# Patient Record
Sex: FEMALE | Race: WHITE | NOT HISPANIC OR LATINO | Employment: FULL TIME | URBAN - METROPOLITAN AREA
[De-identification: names, ages, dates, MRNs, and addresses within clinical notes are randomized per-mention and may not be internally consistent; named-entity substitution may affect disease eponyms.]

---

## 2017-02-20 ENCOUNTER — ALLSCRIPTS OFFICE VISIT (OUTPATIENT)
Dept: OTHER | Facility: OTHER | Age: 47
End: 2017-02-20

## 2017-02-21 DIAGNOSIS — R10.11 RIGHT UPPER QUADRANT PAIN: ICD-10-CM

## 2017-02-21 DIAGNOSIS — R14.0 ABDOMINAL DISTENSION (GASEOUS): ICD-10-CM

## 2017-02-21 DIAGNOSIS — Z12.31 ENCOUNTER FOR SCREENING MAMMOGRAM FOR MALIGNANT NEOPLASM OF BREAST: ICD-10-CM

## 2017-03-09 ENCOUNTER — HOSPITAL ENCOUNTER (OUTPATIENT)
Dept: ULTRASOUND IMAGING | Facility: HOSPITAL | Age: 47
Discharge: HOME | End: 2017-03-09
Attending: FAMILY MEDICINE
Payer: COMMERCIAL

## 2017-03-09 DIAGNOSIS — R10.11 RIGHT UPPER QUADRANT PAIN: ICD-10-CM

## 2017-03-09 DIAGNOSIS — R14.0 ABDOMINAL DISTENSION (GASEOUS): ICD-10-CM

## 2017-03-09 PROCEDURE — 76700 US EXAM ABDOM COMPLETE: CPT

## 2017-03-10 ENCOUNTER — GENERIC CONVERSION - ENCOUNTER (OUTPATIENT)
Dept: OTHER | Facility: OTHER | Age: 47
End: 2017-03-10

## 2017-03-23 ENCOUNTER — GENERIC CONVERSION - ENCOUNTER (OUTPATIENT)
Dept: OTHER | Facility: OTHER | Age: 47
End: 2017-03-23

## 2017-03-23 ENCOUNTER — ALLSCRIPTS OFFICE VISIT (OUTPATIENT)
Dept: OTHER | Facility: OTHER | Age: 47
End: 2017-03-23

## 2017-03-29 LAB
HPV HIGH RISK (HISTORICAL): NORMAL
INTERPRETATION (HISTORICAL): NORMAL

## 2017-05-10 ENCOUNTER — GENERIC CONVERSION - ENCOUNTER (OUTPATIENT)
Dept: OTHER | Facility: OTHER | Age: 47
End: 2017-05-10

## 2017-05-11 ENCOUNTER — ALLSCRIPTS OFFICE VISIT (OUTPATIENT)
Dept: OTHER | Facility: OTHER | Age: 47
End: 2017-05-11

## 2017-05-13 LAB
A/G RATIO (HISTORICAL): 1.8 (ref 1.2–2.2)
ALBUMIN SERPL BCP-MCNC: 3.9 G/DL (ref 3.5–5.5)
ALP SERPL-CCNC: 41 IU/L (ref 39–117)
ALT SERPL W P-5'-P-CCNC: 15 IU/L (ref 0–32)
AST SERPL W P-5'-P-CCNC: 13 IU/L (ref 0–40)
BILIRUB SERPL-MCNC: 0.3 MG/DL (ref 0–1.2)
BUN SERPL-MCNC: 12 MG/DL (ref 6–24)
BUN/CREA RATIO (HISTORICAL): 20 (ref 9–23)
CALCIUM SERPL-MCNC: 8.9 MG/DL (ref 8.7–10.2)
CHLORIDE SERPL-SCNC: 104 MMOL/L (ref 96–106)
CHOLEST SERPL-MCNC: 233 MG/DL (ref 100–199)
CO2 SERPL-SCNC: 25 MMOL/L (ref 18–29)
CREAT SERPL-MCNC: 0.59 MG/DL (ref 0.57–1)
DEPRECATED RDW RBC AUTO: 14.1 % (ref 12.3–15.4)
EGFR AFRICAN AMERICAN (HISTORICAL): 129 ML/MIN/1.73
EGFR-AMERICAN CALC (HISTORICAL): 112 ML/MIN/1.73
GLUCOSE SERPL-MCNC: 90 MG/DL (ref 65–99)
HCT VFR BLD AUTO: 45.7 % (ref 34–46.6)
HGB BLD-MCNC: 15.7 G/DL (ref 11.1–15.9)
MCH RBC QN AUTO: 30.8 PG (ref 26.6–33)
MCHC RBC AUTO-ENTMCNC: 34.4 G/DL (ref 31.5–35.7)
MCV RBC AUTO: 90 FL (ref 79–97)
PLATELET # BLD AUTO: 331 X10E3/UL (ref 150–379)
POTASSIUM SERPL-SCNC: 4.7 MMOL/L (ref 3.5–5.2)
RBC (HISTORICAL): 5.09 X10E6/UL (ref 3.77–5.28)
SODIUM SERPL-SCNC: 142 MMOL/L (ref 134–144)
TOT. GLOBULIN, SERUM (HISTORICAL): 2.2 G/DL (ref 1.5–4.5)
TOTAL PROTEIN (HISTORICAL): 6.1 G/DL (ref 6–8.5)
TSH SERPL DL<=0.05 MIU/L-ACNC: 0.72 UIU/ML (ref 0.45–4.5)
WBC # BLD AUTO: 5.7 X10E3/UL (ref 3.4–10.8)

## 2017-05-15 ENCOUNTER — GENERIC CONVERSION - ENCOUNTER (OUTPATIENT)
Dept: OTHER | Facility: OTHER | Age: 47
End: 2017-05-15

## 2018-01-11 NOTE — RESULT NOTES
Verified Results  * US ABDOMEN COMPLETE [id number] 10:04AM Fadumo Eli Order Number: [id number]    - Patient Instructions: To schedule this appointment, please contact Central Scheduling at [phone]  Test Name Result Flag Reference   US ABDOMEN COMPLETE (Report)     ABDOMEN ULTRASOUND, COMPLETE     INDICATION: Abdominal distention and right upper quadrant abdominal pain  Low-grade fever  COMPARISON: None  TECHNIQUE:  Real-time ultrasound of the abdomen was performed with a curvilinear transducer with both volumetric sweeps and still imaging techniques  FINDINGS:     PANCREAS: Visualized portions of the pancreas are within normal limits  AORTA AND IVC: Visualized portions are normal for patient age  LIVER:   Size: Within normal range  The liver measures 17 7 cm in the midclavicular line  Contour: Surface contour is smooth  Parenchyma: Echogenicity and echotexture are within normal limits  No evidence of suspicious mass  The main portal vein is patent and hepatopetal       BILIARY:   The gallbladder is normal in caliber  No wall thickening or pericholecystic fluid  No stones or sludge identified  Sonographic Chalo Anders sign is negative  No intrahepatic biliary dilatation  CBD measures 4 4 mm  No choledocholithiasis  KIDNEYS:    Right kidney measures 12 4 cm  Left kidney measures 12 6 cm  There is increased echogenicity of renal medullary pyramids  Findings are symmetric bilaterally in a pattern most consistent with medullary nephrocalcinosis  Kidneys are otherwise unremarkable  No obstructive uropathy  SPLEEN:    Measures 11 3 cm  Within normal limits  ASCITES: None  IMPRESSION:     Echogenic renal medullary pyramids most consistent with medullary nephrocalcinosis  Otherwise unremarkable examination         Workstation performed: [id number]     Signed by:   Maricarmen Guallpa MD   3/10/17       Plan  Abdominal bloating    · 2 - Ashely Mays MD, Nadine Arguello  (Gastroenterology) Physician Referral  Consult Only: the  expectation is that the referring provider will communicate back to the patient on  treatment options  Evaluation and Treatment: the expectation is that the referred to  provider will communicate back to the patient on treatment options  Status: Hold For  - Scheduling  Requested for: [id number]  Care Summary provided  : Yes  Nephrocalcinosis    · (1) CBC/ PLT (NO DIFF); Status:Active; Requested for:10Mar2017;    · (1) CHOLESTEROL, TOTAL; Status:Active; Requested for:10Mar2017;    · (1) COMPREHENSIVE METABOLIC PANEL; Status:Active; Requested for:10Mar2017;    · (1) TSH WITH FT4 REFLEX; Status:Active; Requested for:10Mar2017;   Pap smear for cervical cancer screening    · (Q) THINPREP PAP; Status:Active;  Requested for:20Feb2017;

## 2018-01-11 NOTE — RESULT NOTES
Verified Results  (1) CBC/ PLT (NO DIFF) [id number] 12:00AM [employer]     Test Name Result Flag Reference   WBC 5 7 x10E3/uL  3 4-10 8   RBC 5 09 x10E6/uL  3 77-5 28   Hemoglobin 15 7 g/dL  11 1-15 9   Hematocrit 45 7 %  34 0-46  6   MCV 90 fL  79-97   MCH 30 8 pg  26 6-33 0   MCHC 34 4 g/dL  31 5-35 7   RDW 14 1 %  12 3-15 4   Platelets 848 G83X8/KS  150-379     (1) COMPREHENSIVE METABOLIC PANEL [id number] 73:11PG [facility]     Test Name Result Flag Reference   Glucose, Serum 90 mg/dL  65-99   BUN 12 mg/dL  6-24   Creatinine, Serum 0 59 mg/dL  0 57-1 00   BUN/Creatinine Ratio 20  9-23   Sodium, Serum 142 mmol/L  134-144   Potassium, Serum 4 7 mmol/L  3 5-5 2   Chloride, Serum 104 mmol/L     Carbon Dioxide, Total 25 mmol/L  18-29   Calcium, Serum 8 9 mg/dL  8 7-10 2   Protein, Total, Serum 6 1 g/dL  6 0-8 5   Albumin, Serum 3 9 g/dL  3 5-5 5   Globulin, Total 2 2 g/dL  1 5-4 5   A/G Ratio 1 8  1 2-2 2   Bilirubin, Total 0 3 mg/dL  0 0-1 2   Alkaline Phosphatase, S 41 IU/L     AST (SGOT) 13 IU/L  0-40   ALT (SGPT) 15 IU/L  0-32   eGFR If NonAfricn Am 112 mL/min/1 73  >59   eGFR If Africn Am 129 mL/min/1 73  >59     (1) TSH WITH FT4 REFLEX [id number] 12:00AM [facility]     Test Name Result Flag Reference   TSH 0 722 uIU/mL  0 450-4 500     (LC) Cholesterol, Total [id number] 12:00AM [facility]     Test Name Result Flag Reference   Cholesterol, Total 233 mg/dL H 100-199

## 2018-01-13 VITALS
BODY MASS INDEX: 34.91 KG/M2 | SYSTOLIC BLOOD PRESSURE: 130 MMHG | HEIGHT: 63 IN | TEMPERATURE: 99.6 F | DIASTOLIC BLOOD PRESSURE: 80 MMHG | WEIGHT: 197 LBS

## 2018-01-14 VITALS
DIASTOLIC BLOOD PRESSURE: 82 MMHG | BODY MASS INDEX: 34.55 KG/M2 | WEIGHT: 195 LBS | HEIGHT: 63 IN | SYSTOLIC BLOOD PRESSURE: 134 MMHG

## 2018-01-14 VITALS
TEMPERATURE: 97.5 F | HEIGHT: 63 IN | BODY MASS INDEX: 33.66 KG/M2 | SYSTOLIC BLOOD PRESSURE: 142 MMHG | WEIGHT: 190 LBS | DIASTOLIC BLOOD PRESSURE: 84 MMHG

## 2018-05-29 ENCOUNTER — ANNUAL EXAM (OUTPATIENT)
Dept: OBGYN CLINIC | Facility: CLINIC | Age: 48
End: 2018-05-29
Payer: COMMERCIAL

## 2018-05-29 VITALS
BODY MASS INDEX: 34.41 KG/M2 | SYSTOLIC BLOOD PRESSURE: 148 MMHG | HEIGHT: 62 IN | DIASTOLIC BLOOD PRESSURE: 92 MMHG | WEIGHT: 187 LBS

## 2018-05-29 DIAGNOSIS — Z01.419 GYNECOLOGIC EXAM NORMAL: Primary | ICD-10-CM

## 2018-05-29 DIAGNOSIS — N92.6 IRREGULAR BLEEDING: ICD-10-CM

## 2018-05-29 DIAGNOSIS — N92.3 INTERMENSTRUAL BLEEDING: ICD-10-CM

## 2018-05-29 PROBLEM — E83.59 NEPHROCALCINOSIS: Status: ACTIVE | Noted: 2017-03-10

## 2018-05-29 PROBLEM — N29 NEPHROCALCINOSIS: Status: ACTIVE | Noted: 2017-03-10

## 2018-05-29 PROBLEM — M77.11 LATERAL EPICONDYLITIS OF RIGHT ELBOW: Status: ACTIVE | Noted: 2017-05-11

## 2018-05-29 PROBLEM — H00.16 CHALAZION OF LEFT EYE: Status: ACTIVE | Noted: 2017-05-11

## 2018-05-29 PROCEDURE — S0612 ANNUAL GYNECOLOGICAL EXAMINA: HCPCS | Performed by: PHYSICIAN ASSISTANT

## 2018-05-29 NOTE — ASSESSMENT & PLAN NOTE
Reviewed intermenstrual bleeding  Will have blood work done to further evaluate  (CBC and TFTs)  Script for Pelvic ultrasound given today to evaluate endometrial stripe  Office will call with results and appropriate follow up

## 2018-05-29 NOTE — PROGRESS NOTES
Assessment/Plan   Problem List Items Addressed This Visit     Gynecologic exam normal - Primary     Pap guidelines reviewed  Pap with reflex done today  Relevant Orders    Pap Lb, rfx HPV ASCU    Intermenstrual bleeding     Reviewed intermenstrual bleeding  Will have blood work done to further evaluate  (CBC and TFTs)  Script for Pelvic ultrasound given today to evaluate endometrial stripe  Office will call with results and appropriate follow up  Relevant Orders    US pelvis complete w transvaginal    T4, free    TSH, 3rd generation    CBC and differential      Other Visit Diagnoses     Irregular bleeding        Relevant Orders    US pelvis complete w transvaginal    T4, free    TSH, 3rd generation    CBC and differential          Subjective:     Patient ID: Mone Garza is a 41 y o  y o  female  HPI  40 yo seen for annual exam  Reports menses have been more irregular  States LMP first week in 2/2018, then had another menses 2 weeks later, skipped a month and a half, then had two week after that menses  Reports has constant brown spotting over the past few months  Denies bowel or bladder issues  Is concerned because mother had bladder cancer and patient does smoke about 10 cig/day  Unsure if blood in coming from vaginal area or urine but is dark brown  States has not had clotting that she previously had with menses  Menses slowed down but having more breakthrough bleeding  States not worse with intercourse  Minimal cramping  Last pap: 3/23/2017 NILM (-)HRHPV  Last mammogram: unknown  States concerned about radiation and does not want to have done  The following portions of the patient's history were reviewed and updated as appropriate:   She  has a past medical history of Anxiety and Depression    She   Patient Active Problem List    Diagnosis Date Noted    Gynecologic exam normal 05/29/2018    Intermenstrual bleeding 05/29/2018    Chalazion of left eye 05/11/2017    Lateral epicondylitis of right elbow 2017    Nephrocalcinosis 03/10/2017     She  has a past surgical history that includes Excisional hemorrhoidectomy and  section  Her family history includes Cancer in her mother; Diabetes in her father  She  reports that she has been smoking Cigarettes  She has a 15 00 pack-year smoking history  She has never used smokeless tobacco  She reports that she drinks alcohol  She reports that she does not use drugs  No current outpatient prescriptions on file  No current facility-administered medications for this visit  She has No Known Allergies       Menstrual History:  OB History      Para Term  AB Living    3 2 2   1 2    SAB TAB Ectopic Multiple Live Births    1       2         Menarche age: 10  No LMP recorded  Period Duration (Days): 5  Period Pattern: (!) Irregular  Menstrual Flow: Light  Dysmenorrhea: None      Review of Systems   Constitutional: Negative for fatigue, fever and unexpected weight change  HENT: Negative for dental problem and sinus pressure  Eyes: Negative for visual disturbance  Respiratory: Negative for cough, shortness of breath and wheezing  Cardiovascular: Negative for chest pain  Gastrointestinal: Negative for abdominal pain, blood in stool, constipation, diarrhea, nausea and vomiting  Endocrine: Negative for polydipsia  Genitourinary: Negative for difficulty urinating, dyspareunia, dysuria, frequency, hematuria, pelvic pain and urgency  Musculoskeletal: Negative for arthralgias and back pain  Neurological: Negative for dizziness, seizures, light-headedness and headaches  Psychiatric/Behavioral: Negative for suicidal ideas  The patient is not nervous/anxious  Objective:     Physical Exam   Constitutional: She is oriented to person, place, and time  She appears well-developed and well-nourished     Genitourinary: Rectum normal and uterus normal  There is no rash, tenderness, lesion, injury or Bartholin's cyst on the right labia  There is no rash, tenderness, lesion, injury or Bartholin's cyst on the left labia  Vagina exhibits no lesion  There is bleeding (dark brown blood in vaginal vault) in the vagina  No erythema or tenderness in the vagina  No signs of injury around the vagina  No vaginal discharge found  Right adnexum does not display mass, does not display tenderness and does not display fullness  Left adnexum does not display mass, does not display tenderness and does not display fullness  Cervix does not exhibit motion tenderness, lesion or discharge  Uterus is not enlarged, tender, exhibiting a mass, irregular (is regular) or mobile  Rectal exam shows no external hemorrhoid, no internal hemorrhoid, no fissure, no mass, no tenderness, anal tone normal and guaiac negative stool  HENT:   Head: Normocephalic and atraumatic  Neck: No thyromegaly present  Cardiovascular: Normal rate, regular rhythm and normal heart sounds  Exam reveals no gallop and no friction rub  No murmur heard  Pulmonary/Chest: Effort normal and breath sounds normal  No respiratory distress  She has no wheezes  Right breast exhibits no inverted nipple, no mass, no nipple discharge, no skin change and no tenderness  Left breast exhibits no inverted nipple, no mass, no nipple discharge, no skin change and no tenderness  Breasts are symmetrical  There is no breast swelling  Abdominal: Soft  She exhibits no distension and no mass  There is no tenderness  There is no rebound and no guarding  No hernia  Lymphadenopathy:     She has no cervical adenopathy  Right: No inguinal adenopathy present  Left: No inguinal adenopathy present  Neurological: She is alert and oriented to person, place, and time  Skin: Skin is warm and dry  Psychiatric: She has a normal mood and affect   Her behavior is normal

## 2018-06-02 LAB
CYTOLOGIST CVX/VAG CYTO: NORMAL
DX ICD CODE: NORMAL
OTHER STN SPEC: NORMAL
OTHER STN SPEC: NORMAL
PATH REPORT.FINAL DX SPEC: NORMAL
SL AMB NOTE:: NORMAL
SL AMB SPECIMEN ADEQUACY: NORMAL

## 2018-06-09 LAB
BASOPHILS # BLD AUTO: 0 X10E3/UL (ref 0–0.2)
BASOPHILS NFR BLD AUTO: 1 %
EOSINOPHIL # BLD AUTO: 0.2 X10E3/UL (ref 0–0.4)
EOSINOPHIL NFR BLD AUTO: 3 %
ERYTHROCYTE [DISTWIDTH] IN BLOOD BY AUTOMATED COUNT: 13.4 % (ref 12.3–15.4)
HCT VFR BLD AUTO: 40.8 % (ref 34–46.6)
HGB BLD-MCNC: 13.9 G/DL (ref 11.1–15.9)
IMM GRANULOCYTES # BLD: 0 X10E3/UL (ref 0–0.1)
IMM GRANULOCYTES NFR BLD: 0 %
LYMPHOCYTES # BLD AUTO: 1.7 X10E3/UL (ref 0.7–3.1)
LYMPHOCYTES NFR BLD AUTO: 27 %
MCH RBC QN AUTO: 30.8 PG (ref 26.6–33)
MCHC RBC AUTO-ENTMCNC: 34.1 G/DL (ref 31.5–35.7)
MCV RBC AUTO: 91 FL (ref 79–97)
MONOCYTES # BLD AUTO: 0.5 X10E3/UL (ref 0.1–0.9)
MONOCYTES NFR BLD AUTO: 8 %
NEUTROPHILS # BLD AUTO: 4 X10E3/UL (ref 1.4–7)
NEUTROPHILS NFR BLD AUTO: 61 %
PLATELET # BLD AUTO: 292 X10E3/UL (ref 150–379)
RBC # BLD AUTO: 4.51 X10E6/UL (ref 3.77–5.28)
T4 FREE SERPL-MCNC: 1.27 NG/DL (ref 0.82–1.77)
TSH SERPL DL<=0.005 MIU/L-ACNC: 0.55 UIU/ML (ref 0.45–4.5)
WBC # BLD AUTO: 6.5 X10E3/UL (ref 3.4–10.8)

## 2018-06-14 ENCOUNTER — HOSPITAL ENCOUNTER (OUTPATIENT)
Dept: ULTRASOUND IMAGING | Facility: HOSPITAL | Age: 48
Discharge: HOME | End: 2018-06-14
Payer: COMMERCIAL

## 2018-06-14 DIAGNOSIS — N92.6 IRREGULAR BLEEDING: ICD-10-CM

## 2018-06-14 DIAGNOSIS — N92.3 INTERMENSTRUAL BLEEDING: ICD-10-CM

## 2018-06-14 PROCEDURE — 76830 TRANSVAGINAL US NON-OB: CPT

## 2018-06-14 PROCEDURE — 76856 US EXAM PELVIC COMPLETE: CPT

## 2018-06-20 DIAGNOSIS — N83.201 CYST OF RIGHT OVARY: ICD-10-CM

## 2018-06-20 DIAGNOSIS — N92.6 IRREGULAR BLEEDING: Primary | ICD-10-CM

## 2018-06-20 DIAGNOSIS — N83.202 CYST OF LEFT OVARY: ICD-10-CM

## 2018-08-28 ENCOUNTER — OFFICE VISIT (OUTPATIENT)
Dept: FAMILY MEDICINE CLINIC | Facility: CLINIC | Age: 48
End: 2018-08-28
Payer: COMMERCIAL

## 2018-08-28 VITALS
SYSTOLIC BLOOD PRESSURE: 170 MMHG | TEMPERATURE: 99.3 F | HEART RATE: 80 BPM | WEIGHT: 193 LBS | OXYGEN SATURATION: 99 % | DIASTOLIC BLOOD PRESSURE: 98 MMHG | BODY MASS INDEX: 35.3 KG/M2

## 2018-08-28 DIAGNOSIS — G44.1 VASCULAR HEADACHE: ICD-10-CM

## 2018-08-28 DIAGNOSIS — I10 ESSENTIAL HYPERTENSION: Primary | ICD-10-CM

## 2018-08-28 PROBLEM — H00.16 CHALAZION OF LEFT EYE: Status: RESOLVED | Noted: 2017-05-11 | Resolved: 2018-08-28

## 2018-08-28 PROBLEM — M77.11 LATERAL EPICONDYLITIS OF RIGHT ELBOW: Status: RESOLVED | Noted: 2017-05-11 | Resolved: 2018-08-28

## 2018-08-28 PROCEDURE — 99214 OFFICE O/P EST MOD 30 MIN: CPT | Performed by: FAMILY MEDICINE

## 2018-08-28 RX ORDER — DILTIAZEM HYDROCHLORIDE 180 MG/1
180 CAPSULE, COATED, EXTENDED RELEASE ORAL DAILY
Qty: 30 CAPSULE | Refills: 0 | Status: SHIPPED | OUTPATIENT
Start: 2018-08-28 | End: 2018-08-31 | Stop reason: SDUPTHER

## 2018-08-28 NOTE — PROGRESS NOTES
Assessment/Plan:  Essential hypertension  Patient has blood pressure consistent with hypertension recently  She has also had elevated readings it gynecologist   We are going to begin treatment with diltiazem 180 mg daily  She is not pregnant  She believe she is going through menopause  We did discuss that she not become pregnant while taking this medication  We are going to ask her to return later in the week for blood pressure check as well as fasting blood work  She is asked to call sooner as needed  Vascular headache  Patient has been experiencing headaches this week in which may be vascular in nature  She also has elevated blood pressure which could be contributing as well  Hopefully the diltiazem that we are going to give her for her blood pressure control will also be effective for improvement in her headache pattern  She is coming for follow-up in 2-3 days for blood pressure check  If she does not see improvement in her headache pattern we would consider adding specific agent for treatment of her headaches  She is in agreement  Again she will call sooner as needed  Diagnoses and all orders for this visit:    Essential hypertension  -     diltiazem (CARDIZEM CD) 180 mg 24 hr capsule; Take 1 capsule (180 mg total) by mouth daily    Vascular headache          Subjective:   Chief Complaint   Patient presents with    Spots and/or Floaters     pt states they started over the weekend in both eyes, headaches after the vision changes start  she was reminded to get her mammo done  Friday night I drank 2 glasses of wine  Feet swelled  Saturday saw visual with scotoma  Had a migraine the third episode Saturday  Happened once Sunday without HA, Yesterday as well  Scotoma today and developed " a horrible HA "  Visual disturbance occurs over 20 minutes then PLUNKETT frontal PLUNKETT  Took and hour to improve  Tried Motrin No history of same  Menses have been irregular  [facility]   GYN did an US and told to f/u after 3 periods  No meds   [facility]  No history of HAs  Patient ID: Trevor Mckeon is a 41 y o  female  HPI  Patient is a 53-year-old female who presents today with complaint of visual disturbance over the weekend  She states that Friday night she had 2 glasses a wine and did not feel right  Her feet swell the next day  Saturday she noted a visual disturbance which seemed to expand in her visual fields which sounds like scotoma based on her description  These lasted for 15-20 minutes and she ultimately developed a headache after the 3rd episode which occurred Saturday night  She had another episode on Sunday which did not involve a headache she had 1 yesterday as well and this morning she had another event which developed into a horrible headache  She tried Motrin with some improvement  She has no history of migraine headaches seizures or other neurologic symptoms  She does note that recently her menstrual cycles have been irregular  She was evaluated by Gynecology who did an ultrasound and told her she needs a follow-up ultrasound 3 months after she has 3 more periods  She goes [facility]  She is on no new medications  No chest pains irregular heartbeat shortness of breath PND orthopnea focal neurologic symptom other than the scotoma described  The following portions of the patient's history were reviewed and updated as appropriate: allergies, current medications, past family history, past medical history, past social history, past surgical history and problem list     Review of Systems   Constitution: Negative for chills, decreased appetite, fever, malaise/fatigue, weight gain and weight loss  Eyes: Positive for visual disturbance  Cardiovascular: Positive for leg swelling  Negative for chest pain, cyanosis, orthopnea, palpitations and paroxysmal nocturnal dyspnea  Respiratory: Negative  Endocrine: Negative      Hematologic/Lymphatic: Negative for adenopathy and bleeding problem  Neurological: Positive for headaches  Negative for dizziness, focal weakness, light-headedness, loss of balance, numbness, paresthesias, seizures and sensory change  Objective:    Physical Exam   Constitutional: She is oriented to person, place, and time  She appears well-developed and well-nourished  No distress  Obese and in no distress   HENT:   Head: Normocephalic  Mouth/Throat: Oropharynx is clear and moist  No oropharyngeal exudate  Eyes: Conjunctivae and EOM are normal  No scleral icterus  Neck: Neck supple  No JVD present  No thyromegaly present  Cardiovascular: Normal rate, regular rhythm and normal heart sounds  Pulmonary/Chest: Effort normal  No respiratory distress  She has no wheezes  She has no rales  Musculoskeletal: She exhibits no edema or tenderness  Lymphadenopathy:     She has no cervical adenopathy  Neurological: She is alert and oriented to person, place, and time  She has normal reflexes  She displays normal reflexes  No cranial nerve deficit  She exhibits normal muscle tone  Coordination normal    Neurologic examination is presently nonfocal   Psychiatric: She has a normal mood and affect

## 2018-08-29 NOTE — ASSESSMENT & PLAN NOTE
Patient has blood pressure consistent with hypertension recently  She has also had elevated readings it gynecologist   We are going to begin treatment with diltiazem 180 mg daily  She is not pregnant  She believe she is going through menopause  We did discuss that she not become pregnant while taking this medication  We are going to ask her to return later in the week for blood pressure check as well as fasting blood work  She is asked to call sooner as needed

## 2018-08-29 NOTE — ASSESSMENT & PLAN NOTE
Patient has been experiencing headaches this week in which may be vascular in nature  She also has elevated blood pressure which could be contributing as well  Hopefully the diltiazem that we are going to give her for her blood pressure control will also be effective for improvement in her headache pattern  She is coming for follow-up in 2-3 days for blood pressure check  If she does not see improvement in her headache pattern we would consider adding specific agent for treatment of her headaches  She is in agreement  Again she will call sooner as needed

## 2018-08-29 NOTE — PATIENT INSTRUCTIONS
Please begin taking diltiazem 1 daily  Please follow-up for blood pressure check and fasting blood work later this week  Call sooner should you have worsening of your headaches or any other new symptoms

## 2018-08-31 ENCOUNTER — OFFICE VISIT (OUTPATIENT)
Dept: FAMILY MEDICINE CLINIC | Facility: CLINIC | Age: 48
End: 2018-08-31
Payer: COMMERCIAL

## 2018-08-31 VITALS
TEMPERATURE: 98.1 F | OXYGEN SATURATION: 99 % | WEIGHT: 193 LBS | HEART RATE: 75 BPM | SYSTOLIC BLOOD PRESSURE: 136 MMHG | BODY MASS INDEX: 35.3 KG/M2 | DIASTOLIC BLOOD PRESSURE: 84 MMHG

## 2018-08-31 DIAGNOSIS — I10 ESSENTIAL HYPERTENSION: ICD-10-CM

## 2018-08-31 PROCEDURE — 99214 OFFICE O/P EST MOD 30 MIN: CPT | Performed by: FAMILY MEDICINE

## 2018-08-31 RX ORDER — HYDROCHLOROTHIAZIDE 25 MG/1
25 TABLET ORAL DAILY
Qty: 30 TABLET | Refills: 2 | Status: SHIPPED | OUTPATIENT
Start: 2018-08-31 | End: 2019-01-05 | Stop reason: SDUPTHER

## 2018-08-31 RX ORDER — DILTIAZEM HYDROCHLORIDE 240 MG/1
240 CAPSULE, COATED, EXTENDED RELEASE ORAL DAILY
Qty: 30 CAPSULE | Refills: 2 | Status: SHIPPED | OUTPATIENT
Start: 2018-08-31 | End: 2019-01-05 | Stop reason: SDUPTHER

## 2018-08-31 NOTE — PROGRESS NOTES
Assessment/Plan:  Essential hypertension   The patient's blood pressure has remained moderately elevated  We are going to increase her diltiazem to 240 and add hydrochlorothiazide 25  We had a long discussion with her in regards to lifestyle modification  We asked her to discontinue tobacco, reduce her alcohol intake and begin regular aerobic exercise to include walking bike riding X cetera  We asked her to return in 5-7 days for blood pressure recheck as well as fasting blood work which will include CBC CMP lipids TSH and a Lyme titer  Of note her headaches have been much improved though she did have 1 again this morning  This may have been related to caffeine withdrawal as she has been cutting back on her caffeine intake  She had no focal neurologic symptoms  She has had no cardiovascular symptomatology  We did note her that should she develop any associated symptoms such as chest pain shortness of breath diaphoresis nausea X cetera she would have to call 911 or proceed for more urgent medical attention  She understands  Diagnoses and all orders for this visit:    Essential hypertension  -     hydrochlorothiazide (HYDRODIURIL) 25 mg tablet; Take 1 tablet (25 mg total) by mouth daily for 30 days  -     diltiazem (CARDIZEM CD) 240 mg 24 hr capsule; Take 1 capsule (240 mg total) by mouth daily          Subjective:   Chief Complaint   Patient presents with    Blood Pressure Check     pt here for a recheck today  states she still has a headache off an on  Today first HA since last visit  BP was 210/110 at chiropractors office yesterday  Home readings 156/96 this am  165/103  Patient ID: Sharon Ariza is a 41 y o  female  HPI  Patient is a 60-year-old female who presents today for follow-up of her recently diagnosed hypertension  She states that she was not having any headaches till this morning when she developed 1 in the mid morning hours    She did see the chiropractor yesterday and he obtained blood pressure reading of  210 [id number]  Which caused her significant anxiety  She has been worried ever since  She denies any chest pain shortness of breath diaphoresis nausea PND orthopnea X cetera  She has felt fatigue since beginning the blood pressure medicine  She has had no diplopia or focal neurologic symptoms  She has been cutting back on coffee and feels that this may be the explanation for headaches  She has had no side effects from the Verapamil which she has been compliant with other than possibly the fatigue  She continues to smoke cigarettes, she drinks alcohol regularly as well  The following portions of the patient's history were reviewed and updated as appropriate: allergies, current medications, past medical history, past social history and problem list     Review of Systems   Constitution: Positive for malaise/fatigue  Negative for decreased appetite, fever and weakness  Cardiovascular: Negative for chest pain, dyspnea on exertion, leg swelling, orthopnea and paroxysmal nocturnal dyspnea  Respiratory: Negative for cough, hemoptysis, shortness of breath, sputum production and wheezing  Neurological: Positive for headaches  Negative for dizziness and focal weakness  Psychiatric/Behavioral: Negative for depression  The patient is nervous/anxious  The patient does not have insomnia  Objective:    Physical Exam   Constitutional: She is oriented to person, place, and time  She appears well-developed and well-nourished  Obese in no distress   Neck: Neck supple  No JVD present  No thyromegaly present  Cardiovascular: Normal rate, regular rhythm and normal heart sounds  No murmur heard  Pulmonary/Chest: Effort normal and breath sounds normal    Musculoskeletal: She exhibits no edema  Neurological: She is alert and oriented to person, place, and time     Psychiatric:   Anxious

## 2018-08-31 NOTE — ASSESSMENT & PLAN NOTE
The patient's blood pressure has remained moderately elevated  We are going to increase her diltiazem to 240 and add hydrochlorothiazide 25  We had a long discussion with her in regards to lifestyle modification  We asked her to discontinue tobacco, reduce her alcohol intake and begin regular aerobic exercise to include walking bike riding X cetera  We asked her to return in 5-7 days for blood pressure recheck as well as fasting blood work which will include CBC CMP lipids TSH and a Lyme titer  Of note her headaches have been much improved though she did have 1 again this morning  This may have been related to caffeine withdrawal as she has been cutting back on her caffeine intake  She had no focal neurologic symptoms  She has had no cardiovascular symptomatology  We did note her that should she develop any associated symptoms such as chest pain shortness of breath diaphoresis nausea X cetera she would have to call 911 or proceed for more urgent medical attention  She understands

## 2018-09-05 ENCOUNTER — OFFICE VISIT (OUTPATIENT)
Dept: FAMILY MEDICINE CLINIC | Facility: CLINIC | Age: 48
End: 2018-09-05
Payer: COMMERCIAL

## 2018-09-05 VITALS
TEMPERATURE: 97.8 F | WEIGHT: 188 LBS | HEART RATE: 90 BPM | SYSTOLIC BLOOD PRESSURE: 130 MMHG | DIASTOLIC BLOOD PRESSURE: 78 MMHG | BODY MASS INDEX: 34.39 KG/M2 | OXYGEN SATURATION: 97 %

## 2018-09-05 DIAGNOSIS — F41.9 ANXIETY: ICD-10-CM

## 2018-09-05 DIAGNOSIS — I10 ESSENTIAL HYPERTENSION: Primary | ICD-10-CM

## 2018-09-05 DIAGNOSIS — Z13.220 SCREENING, LIPID: ICD-10-CM

## 2018-09-05 DIAGNOSIS — E66.9 OBESITY (BMI 30-39.9): ICD-10-CM

## 2018-09-05 DIAGNOSIS — Z13.0 SCREENING FOR IRON DEFICIENCY ANEMIA: ICD-10-CM

## 2018-09-05 DIAGNOSIS — G44.1 VASCULAR HEADACHE: ICD-10-CM

## 2018-09-05 PROCEDURE — 3078F DIAST BP <80 MM HG: CPT | Performed by: FAMILY MEDICINE

## 2018-09-05 PROCEDURE — 3075F SYST BP GE 130 - 139MM HG: CPT | Performed by: FAMILY MEDICINE

## 2018-09-05 PROCEDURE — 99214 OFFICE O/P EST MOD 30 MIN: CPT | Performed by: FAMILY MEDICINE

## 2018-09-05 NOTE — ASSESSMENT & PLAN NOTE
She has lost 5 lb over the last 5 days  We congratulated her for this  We asked her to continue with her healthy diet and improved exercise pattern with daily walking

## 2018-09-05 NOTE — ASSESSMENT & PLAN NOTE
She has generalized anxiety  For now we are going to observe  Would consider pharmacotherapy in the future though I believe that as her anxiety over her blood pressure improves I do not believe she will need it

## 2018-09-05 NOTE — ASSESSMENT & PLAN NOTE
Blood pressure appears well controlled today  She is going to continue with diltiazem as well as hydrochlorothiazide  We asked her to return in 6 weeks for follow-up visit

## 2018-09-05 NOTE — PROGRESS NOTES
Assessment/Plan:  Essential hypertension  Blood pressure appears well controlled today  She is going to continue with diltiazem as well as hydrochlorothiazide  We asked her to return in 6 weeks for follow-up visit  Vascular headache  Headache pattern is much improved presently  This may have been a combination of hypertension as well as caffeine withdrawal   She has cut back on her caffeine consumption though she continues to drink some coffee  She has no focal neurologic or cardiovascular symptomatology present    Obesity (BMI 30-39  9)  She has lost 5 lb over the last 5 days  We congratulated her for this  We asked her to continue with her healthy diet and improved exercise pattern with daily walking  Anxiety  She has generalized anxiety  For now we are going to observe  Would consider pharmacotherapy in the future though I believe that as her anxiety over her blood pressure improves I do not believe she will need it  Diagnoses and all orders for this visit:    Essential hypertension  -     Comprehensive metabolic panel  -     TSH, 3rd generation with Free T4 reflex    Vascular headache  -     Lyme Antibody Profile with reflex to WB    Obesity (BMI 30-39  9)    Anxiety    Screening, lipid  -     Lipid panel    Screening for iron deficiency anemia  -     CBC          Subjective:   Chief Complaint   Patient presents with    Blood Pressure Check     pt here for a recheck of her bp and fbw today  I still feel anxious and tight in my chest at times  Walking improves it  Not exertional  No edema  No cramping  No focal neuro symptoms  Cut out ETOH  Patient ID: Amarilis Salazar is a 57 y o  female  HPI  The patient is a 61-year-old female presents for follow-up of essential hypertension as well as obesity and tobacco addiction  She states that she has been compliant with her hydrochlorothiazide as well as diltiazem    She did feel lightheaded a couple of times after rising from bed or getting out of the shower but this is been a fleeting sensation  She still feels anxious and some tightness in her chest at times as well  This is not exertional and not persistent  She has no associated symptoms such as shortness of breath, diaphoresis, rapid heartbeat X cetera  She has had no cramping in her legs no edema PND orthopnea  She has had no focal neurologic symptoms  She did note to drinking alcohol made her feel fatigued and dizzy so she has discontinued that  She attempts to cut back on her cigarette smoking  She tried any cigarette but she discontinued this as well  She has been walking more and she states that she feels this has been improving her overall feeling of wellness  The following portions of the patient's history were reviewed and updated as appropriate: allergies, current medications, past medical history, past social history and problem list     Review of Systems   Constitution: Positive for weight loss  Negative for decreased appetite, fever, malaise/fatigue and night sweats  5 lb weight loss   Cardiovascular: Negative for chest pain, irregular heartbeat, leg swelling, near-syncope, orthopnea and paroxysmal nocturnal dyspnea  Respiratory: Negative for cough, hemoptysis, shortness of breath and sputum production  Endocrine: Negative for polydipsia, polyphagia and polyuria  Neurological: Positive for headaches and light-headedness  Negative for dizziness and focal weakness  Headache pattern much improved   Psychiatric/Behavioral: Negative for depression and suicidal ideas  The patient is nervous/anxious  The patient does not have insomnia  Objective:    Physical Exam   Constitutional: She is oriented to person, place, and time  She appears well-developed and well-nourished  Obese in no distress   Neck: Neck supple  No JVD present  No thyromegaly present  Cardiovascular: Normal rate, regular rhythm and normal heart sounds    Exam reveals no gallop  No murmur heard  Heart rate of 72   Pulmonary/Chest: Effort normal and breath sounds normal  No respiratory distress  She has no wheezes  She has no rales  Musculoskeletal: She exhibits no edema  Neurological: She is alert and oriented to person, place, and time  Psychiatric: She has a normal mood and affect   Thought content normal

## 2018-09-05 NOTE — ASSESSMENT & PLAN NOTE
Headache pattern is much improved presently  This may have been a combination of hypertension as well as caffeine withdrawal   She has cut back on her caffeine consumption though she continues to drink some coffee    She has no focal neurologic or cardiovascular symptomatology present

## 2018-09-05 NOTE — PATIENT INSTRUCTIONS
Please continue to watch your diet and walk daily  Congratulations on losing 5 lb since your last visit  Please continue with your current medications  Please continue your efforts toward quit smoking  Please call in a few days for results of your blood work  We will see her back in 6 weeks  Please call sooner as needed

## 2018-09-07 LAB
ALBUMIN SERPL-MCNC: 5 G/DL (ref 3.5–5.5)
ALBUMIN/GLOB SERPL: 2 {RATIO} (ref 1.2–2.2)
ALP SERPL-CCNC: 46 IU/L (ref 39–117)
ALT SERPL-CCNC: 20 IU/L (ref 0–32)
AST SERPL-CCNC: 18 IU/L (ref 0–40)
B BURGDOR IGG+IGM SER-ACNC: <0.91 ISR (ref 0–0.9)
BILIRUB SERPL-MCNC: 0.8 MG/DL (ref 0–1.2)
BUN SERPL-MCNC: 14 MG/DL (ref 6–24)
BUN/CREAT SERPL: 19 (ref 9–23)
CALCIUM SERPL-MCNC: 9.9 MG/DL (ref 8.7–10.2)
CHLORIDE SERPL-SCNC: 95 MMOL/L (ref 96–106)
CHOLEST SERPL-MCNC: 306 MG/DL (ref 100–199)
CHOLEST/HDLC SERPL: 7 RATIO (ref 0–4.4)
CO2 SERPL-SCNC: 27 MMOL/L (ref 20–29)
CREAT SERPL-MCNC: 0.74 MG/DL (ref 0.57–1)
CYTOLOGY CMNT CVX/VAG CYTO-IMP: ABNORMAL
ERYTHROCYTE [DISTWIDTH] IN BLOOD BY AUTOMATED COUNT: 14.1 % (ref 12.3–15.4)
GLOBULIN SER-MCNC: 2.5 G/DL (ref 1.5–4.5)
GLUCOSE SERPL-MCNC: 125 MG/DL (ref 65–99)
HCT VFR BLD AUTO: 44.9 % (ref 34–46.6)
HDLC SERPL-MCNC: 44 MG/DL
HGB BLD-MCNC: 15.3 G/DL (ref 11.1–15.9)
LDLC SERPL CALC-MCNC: 219 MG/DL (ref 0–99)
MCH RBC QN AUTO: 31.1 PG (ref 26.6–33)
MCHC RBC AUTO-ENTMCNC: 34.1 G/DL (ref 31.5–35.7)
MCV RBC AUTO: 91 FL (ref 79–97)
PLATELET # BLD AUTO: 372 X10E3/UL (ref 150–379)
POTASSIUM SERPL-SCNC: 3.6 MMOL/L (ref 3.5–5.2)
PROT SERPL-MCNC: 7.5 G/DL (ref 6–8.5)
RBC # BLD AUTO: 4.92 X10E6/UL (ref 3.77–5.28)
SL AMB EGFR AFRICAN AMERICAN: 112 ML/MIN/1.73
SL AMB EGFR NON AFRICAN AMERICAN: 97 ML/MIN/1.73
SL AMB VLDL CHOLESTEROL CALC: 43 MG/DL (ref 5–40)
SODIUM SERPL-SCNC: 140 MMOL/L (ref 134–144)
TRIGL SERPL-MCNC: 215 MG/DL (ref 0–149)
TSH SERPL DL<=0.005 MIU/L-ACNC: 0.63 UIU/ML (ref 0.45–4.5)
WBC # BLD AUTO: 9.4 X10E3/UL (ref 3.4–10.8)

## 2018-09-17 LAB
HBA1C MFR BLD: 5.2 % (ref 4.8–5.6)
LABCORP COMMENT: NORMAL

## 2019-01-05 DIAGNOSIS — I10 ESSENTIAL HYPERTENSION: ICD-10-CM

## 2019-01-07 RX ORDER — HYDROCHLOROTHIAZIDE 25 MG/1
25 TABLET ORAL DAILY
Qty: 30 TABLET | Refills: 2 | Status: SHIPPED | OUTPATIENT
Start: 2019-01-07 | End: 2019-04-30 | Stop reason: SDUPTHER

## 2019-01-07 RX ORDER — DILTIAZEM HYDROCHLORIDE 240 MG/1
CAPSULE, COATED, EXTENDED RELEASE ORAL
Qty: 30 CAPSULE | Refills: 0 | Status: SHIPPED | OUTPATIENT
Start: 2019-01-07 | End: 2019-02-12 | Stop reason: SDUPTHER

## 2019-02-12 DIAGNOSIS — I10 ESSENTIAL HYPERTENSION: ICD-10-CM

## 2019-02-12 RX ORDER — DILTIAZEM HYDROCHLORIDE 240 MG/1
CAPSULE, COATED, EXTENDED RELEASE ORAL
Qty: 30 CAPSULE | Refills: 0 | Status: SHIPPED | OUTPATIENT
Start: 2019-02-12 | End: 2019-03-18 | Stop reason: SDUPTHER

## 2019-03-18 DIAGNOSIS — I10 ESSENTIAL HYPERTENSION: ICD-10-CM

## 2019-03-18 RX ORDER — DILTIAZEM HYDROCHLORIDE 240 MG/1
CAPSULE, COATED, EXTENDED RELEASE ORAL
Qty: 30 CAPSULE | Refills: 0 | Status: SHIPPED | OUTPATIENT
Start: 2019-03-18 | End: 2019-04-30 | Stop reason: ALTCHOICE

## 2019-03-26 DIAGNOSIS — N83.202 LEFT OVARIAN CYST: Primary | ICD-10-CM

## 2019-04-01 ENCOUNTER — HOSPITAL ENCOUNTER (OUTPATIENT)
Dept: ULTRASOUND IMAGING | Facility: HOSPITAL | Age: 49
Discharge: HOME | End: 2019-04-01
Payer: COMMERCIAL

## 2019-04-01 DIAGNOSIS — N83.202 LEFT OVARIAN CYST: ICD-10-CM

## 2019-04-01 PROCEDURE — 76830 TRANSVAGINAL US NON-OB: CPT

## 2019-04-01 PROCEDURE — 76856 US EXAM PELVIC COMPLETE: CPT

## 2019-04-05 DIAGNOSIS — N83.292 COMPLEX CYST OF LEFT OVARY: Primary | ICD-10-CM

## 2019-04-13 DIAGNOSIS — I10 ESSENTIAL HYPERTENSION: ICD-10-CM

## 2019-04-15 RX ORDER — HYDROCHLOROTHIAZIDE 25 MG/1
25 TABLET ORAL DAILY
Qty: 30 TABLET | Refills: 2 | OUTPATIENT
Start: 2019-04-15 | End: 2019-05-15

## 2019-04-19 ENCOUNTER — HOSPITAL ENCOUNTER (OUTPATIENT)
Dept: MRI IMAGING | Facility: HOSPITAL | Age: 49
Discharge: HOME | End: 2019-04-19
Payer: COMMERCIAL

## 2019-04-19 DIAGNOSIS — N83.292 COMPLEX CYST OF LEFT OVARY: ICD-10-CM

## 2019-04-19 PROCEDURE — A9585 GADOBUTROL INJECTION: HCPCS | Performed by: PHYSICIAN ASSISTANT

## 2019-04-19 PROCEDURE — 72197 MRI PELVIS W/O & W/DYE: CPT

## 2019-04-19 RX ADMIN — GADOBUTROL 8 ML: 604.72 INJECTION INTRAVENOUS at 15:54

## 2019-04-30 ENCOUNTER — OFFICE VISIT (OUTPATIENT)
Dept: FAMILY MEDICINE CLINIC | Facility: CLINIC | Age: 49
End: 2019-04-30
Payer: COMMERCIAL

## 2019-04-30 VITALS
HEART RATE: 78 BPM | WEIGHT: 190 LBS | BODY MASS INDEX: 33.66 KG/M2 | TEMPERATURE: 98.8 F | OXYGEN SATURATION: 95 % | HEIGHT: 63 IN | DIASTOLIC BLOOD PRESSURE: 82 MMHG | SYSTOLIC BLOOD PRESSURE: 130 MMHG

## 2019-04-30 DIAGNOSIS — I10 ESSENTIAL HYPERTENSION: Primary | ICD-10-CM

## 2019-04-30 DIAGNOSIS — R01.1 SYSTOLIC MURMUR: ICD-10-CM

## 2019-04-30 DIAGNOSIS — G44.1 VASCULAR HEADACHE: ICD-10-CM

## 2019-04-30 DIAGNOSIS — Z12.31 ENCOUNTER FOR SCREENING MAMMOGRAM FOR MALIGNANT NEOPLASM OF BREAST: ICD-10-CM

## 2019-04-30 DIAGNOSIS — E66.9 OBESITY (BMI 30-39.9): ICD-10-CM

## 2019-04-30 PROCEDURE — 3079F DIAST BP 80-89 MM HG: CPT | Performed by: FAMILY MEDICINE

## 2019-04-30 PROCEDURE — 99214 OFFICE O/P EST MOD 30 MIN: CPT | Performed by: FAMILY MEDICINE

## 2019-04-30 PROCEDURE — 3008F BODY MASS INDEX DOCD: CPT | Performed by: FAMILY MEDICINE

## 2019-04-30 PROCEDURE — 3075F SYST BP GE 130 - 139MM HG: CPT | Performed by: FAMILY MEDICINE

## 2019-04-30 RX ORDER — HYDROCHLOROTHIAZIDE 25 MG/1
25 TABLET ORAL DAILY
Qty: 90 TABLET | Refills: 1 | Status: SHIPPED | OUTPATIENT
Start: 2019-04-30 | End: 2021-06-10

## 2019-06-25 ENCOUNTER — ANNUAL EXAM (OUTPATIENT)
Dept: OBGYN CLINIC | Facility: CLINIC | Age: 49
End: 2019-06-25
Payer: COMMERCIAL

## 2019-06-25 VITALS
SYSTOLIC BLOOD PRESSURE: 130 MMHG | WEIGHT: 192 LBS | HEIGHT: 62 IN | BODY MASS INDEX: 35.33 KG/M2 | DIASTOLIC BLOOD PRESSURE: 84 MMHG

## 2019-06-25 DIAGNOSIS — N92.6 IRREGULAR BLEEDING: ICD-10-CM

## 2019-06-25 DIAGNOSIS — Z01.419 GYNECOLOGIC EXAM NORMAL: Primary | ICD-10-CM

## 2019-06-25 PROCEDURE — S0612 ANNUAL GYNECOLOGICAL EXAMINA: HCPCS | Performed by: PHYSICIAN ASSISTANT

## 2019-06-25 RX ORDER — ACETAMINOPHEN AND CODEINE PHOSPHATE 120; 12 MG/5ML; MG/5ML
1 SOLUTION ORAL DAILY
Qty: 30 TABLET | Refills: 2 | Status: SHIPPED | OUTPATIENT
Start: 2019-06-25 | End: 2021-06-10 | Stop reason: ALTCHOICE

## 2019-06-30 PROBLEM — N92.6 IRREGULAR BLEEDING: Status: ACTIVE | Noted: 2019-06-30

## 2019-09-24 ENCOUNTER — TELEPHONE (OUTPATIENT)
Dept: OBGYN CLINIC | Facility: CLINIC | Age: 49
End: 2019-09-24

## 2019-11-19 RX ORDER — DILTIAZEM HYDROCHLORIDE 240 MG/1
CAPSULE, COATED, EXTENDED RELEASE ORAL
Qty: 30 CAPSULE | Refills: 0 | OUTPATIENT
Start: 2019-11-19

## 2019-11-20 DIAGNOSIS — I10 ESSENTIAL HYPERTENSION: ICD-10-CM

## 2019-11-20 RX ORDER — HYDROCHLOROTHIAZIDE 25 MG/1
TABLET ORAL
Qty: 30 TABLET | Refills: 8 | OUTPATIENT
Start: 2019-11-20

## 2021-06-09 ENCOUNTER — TELEPHONE (OUTPATIENT)
Dept: OBGYN CLINIC | Facility: CLINIC | Age: 51
End: 2021-06-09

## 2021-06-10 ENCOUNTER — OFFICE VISIT (OUTPATIENT)
Dept: OBGYN CLINIC | Facility: CLINIC | Age: 51
End: 2021-06-10
Payer: COMMERCIAL

## 2021-06-10 ENCOUNTER — APPOINTMENT (OUTPATIENT)
Dept: LAB | Facility: HOSPITAL | Age: 51
End: 2021-06-10
Payer: COMMERCIAL

## 2021-06-10 VITALS
HEIGHT: 64 IN | SYSTOLIC BLOOD PRESSURE: 138 MMHG | BODY MASS INDEX: 29.88 KG/M2 | DIASTOLIC BLOOD PRESSURE: 86 MMHG | WEIGHT: 175 LBS

## 2021-06-10 DIAGNOSIS — Z11.3 SCREENING FOR STD (SEXUALLY TRANSMITTED DISEASE): ICD-10-CM

## 2021-06-10 DIAGNOSIS — N90.89 VULVAR LUMP: Primary | ICD-10-CM

## 2021-06-10 PROCEDURE — 99214 OFFICE O/P EST MOD 30 MIN: CPT | Performed by: PHYSICIAN ASSISTANT

## 2021-06-10 PROCEDURE — 87340 HEPATITIS B SURFACE AG IA: CPT

## 2021-06-10 PROCEDURE — 86592 SYPHILIS TEST NON-TREP QUAL: CPT

## 2021-06-10 PROCEDURE — 87389 HIV-1 AG W/HIV-1&-2 AB AG IA: CPT

## 2021-06-10 PROCEDURE — 86803 HEPATITIS C AB TEST: CPT

## 2021-06-10 PROCEDURE — 36415 COLL VENOUS BLD VENIPUNCTURE: CPT

## 2021-06-10 RX ORDER — AMLODIPINE BESYLATE AND BENAZEPRIL HYDROCHLORIDE 5; 10 MG/1; MG/1
CAPSULE ORAL
COMMUNITY
Start: 2021-06-09

## 2021-06-10 NOTE — PROGRESS NOTES
Assessment/Plan   Diagnoses and all orders for this visit:    Vulvar lump  Reviewed vulvar lump either blocked follicle, duct, subdermal cyst - encourage patient to leave alone and most likely will resolves completely on own  Can consider warm compresses/tea bags if desires  Patient to call if symptoms persist or worsen  All questions answered at this time  Screening for STD (sexually transmitted disease)  -     Hepatitis B surface antigen; Future  -     Hepatitis C antibody; Future  -     HIV 1/2 Antigen/Antibody (4th Generation) w Reflex [facility]; Future  -     RPR; Future  -     Ct, Ng, Trich vag by PONCHO  Encourage safe sexual practices - limiting number of sexual partners and consistent condom use  C/G/T collected and will treat based on results  Slip for STD labs given - can consider repeat in 6 months and 12 months if desires    Discussion  RTO for APE ASAP since overdue or sooner if needed    Subjective     HPI   Bertha العلي is a 52 y o  female who presents for a vaginal lump for the past week - found with washing - no significant pain or discomfort  Feels like it is getting smaller - did notice a possible white head to it  Located on the left side  No vulvar itch/burn; No vaginal itch/burn; No abn discharge or odor; No urinary sx - burning/pain/frequency/hematuria  No abd/pelvic pain; No fever/chills  LMP - end of May 2021; Periods are irreg for the past couple years - the longest interval has been 5 months without a period; periods last 5 days; No excessive bleeding, intermenstrual bleeding/spotting, or cramping out of the ordinary      Pt is not currently sexually active - recently patient just found out her significant other of 3 years cheated on her; Requests std cultures/hiv/hep testing; Feels safe at home  Current contraception: none    Last Pap - 5/29/18 - WNL; 3/23/17 - WNL (-) HRHPV type 16/18 neg (last annual 6/25/19)    Review of Systems   Constitutional: Negative for activity change, fatigue, fever and unexpected weight change  HENT: Negative for congestion, dental problem, sinus pressure and sinus pain  Eyes: Negative for visual disturbance  Respiratory: Negative for cough, shortness of breath and wheezing  Cardiovascular: Negative for chest pain and leg swelling  Gastrointestinal: Negative for abdominal distention, abdominal pain, blood in stool, constipation, diarrhea, nausea and vomiting  Endocrine: Negative for polydipsia  Genitourinary: Negative for difficulty urinating, dyspareunia, dysuria, frequency, hematuria, menstrual problem, pelvic pain, urgency, vaginal bleeding, vaginal discharge and vaginal pain  Musculoskeletal: Negative for arthralgias and back pain  Allergic/Immunologic: Negative for environmental allergies  Neurological: Negative for dizziness, seizures and headaches  Psychiatric/Behavioral: Negative for dysphoric mood and sleep disturbance  The patient is not nervous/anxious          The following portions of the patient's history were reviewed and updated as appropriate: allergies, current medications, past family history, past medical history, past social history, past surgical history and problem list          Past Medical History:   Diagnosis Date    Anxiety     Chalazion of left eye 2017    Depression     Generalized anxiety disorder 2013    Lateral epicondylitis of right elbow 2017       Past Surgical History:   Procedure Laterality Date     SECTION      EXCISIONAL HEMORRHOIDECTOMY      HEMORRHOID SURGERY         Family History   Problem Relation Age of Onset    Cancer Mother         bladder    Diabetes Father        Social History     Socioeconomic History    Marital status:      Spouse name: Not on file    Number of children: Not on file    Years of education: Not on file    Highest education level: Not on file   Occupational History    Not on file   Social Needs    Financial resource strain: Not on file  Food insecurity     Worry: Not on file     Inability: Not on file    Transportation needs     Medical: Not on file     Non-medical: Not on file   Tobacco Use    Smoking status: Current Every Day Smoker     Packs/day: 0 50     Years: 30 00     Pack years: 15 00     Types: Cigarettes    Smokeless tobacco: Never Used   Substance and Sexual Activity    Alcohol use: Yes     Frequency: Monthly or less     Drinks per session: 1 or 2     Binge frequency: Never    Drug use: No    Sexual activity: Yes     Partners: Male   Lifestyle    Physical activity     Days per week: Not on file     Minutes per session: Not on file    Stress: Not on file   Relationships    Social connections     Talks on phone: Not on file     Gets together: Not on file     Attends Anabaptism service: Not on file     Active member of club or organization: Not on file     Attends meetings of clubs or organizations: Not on file     Relationship status: Not on file    Intimate partner violence     Fear of current or ex partner: Not on file     Emotionally abused: Not on file     Physically abused: Not on file     Forced sexual activity: Not on file   Other Topics Concern    Not on file   Social History Narrative    Not on file         Current Outpatient Medications:     amLODIPine-benazepril (LOTREL 5-10) 5-10 MG per capsule, , Disp: , Rfl:     norethindrone (MICRONOR) 0 35 MG tablet, Take 1 tablet (0 35 mg total) by mouth daily (Patient not taking: Reported on 6/10/2021), Disp: 30 tablet, Rfl: 2    No Known Allergies    Objective   Vitals:    06/10/21 1030   BP: 138/86   BP Location: Left arm   Patient Position: Sitting   Cuff Size: Standard   Weight: 79 4 kg (175 lb)   Height: 5' 4" (1 626 m)     Physical Exam  Vitals signs reviewed  Constitutional:       General: She is awake  She is not in acute distress  Appearance: Normal appearance  She is well-developed and well-groomed  She is not diaphoretic     Eyes:      Conjunctiva/sclera: Conjunctivae normal    Abdominal:      General: There is no distension  Palpations: Abdomen is soft  There is no hepatomegaly, splenomegaly or mass  Tenderness: There is no abdominal tenderness  Hernia: No hernia is present  There is no hernia in the left inguinal area or right inguinal area  Genitourinary:     General: Normal vulva  Exam position: Supine  Pubic Area: No rash or pubic lice  Labia:         Right: No rash, tenderness, lesion or injury  Left: No rash, tenderness, lesion or injury  Urethra: No prolapse, urethral pain, urethral swelling or urethral lesion  Vagina: Normal  No signs of injury and foreign body  No vaginal discharge, erythema, tenderness, bleeding, lesions or prolapsed vaginal walls  Cervix: No cervical motion tenderness, discharge, friability, lesion, erythema or cervical bleeding  Uterus: Not deviated, not enlarged, not fixed, not tender and no uterine prolapse  Adnexa:         Right: No mass, tenderness or fullness  Left: No mass, tenderness or fullness  Lymphadenopathy:      Lower Body: No right inguinal adenopathy  No left inguinal adenopathy  Skin:     General: Skin is warm and dry  Neurological:      Mental Status: She is alert and oriented to person, place, and time  Psychiatric:         Mood and Affect: Mood and affect normal          Speech: Speech normal          Behavior: Behavior normal  Behavior is cooperative  Thought Content:  Thought content normal          Judgment: Judgment normal

## 2021-06-11 LAB
C TRACH RRNA SPEC QL NAA+PROBE: NOT DETECTED
HBV SURFACE AG SER QL: NORMAL
HCV AB SER QL: NORMAL
HIV 1+2 AB+HIV1 P24 AG SERPL QL IA: NORMAL
N GONORRHOEA RRNA SPEC QL NAA+PROBE: NOT DETECTED
RPR SER QL: NORMAL
SL AMB T VAGINALIS, QL TMA, PAP VIAL: NOT DETECTED

## 2021-11-11 ENCOUNTER — OFFICE VISIT (OUTPATIENT)
Dept: URGENT CARE | Facility: CLINIC | Age: 51
End: 2021-11-11
Payer: COMMERCIAL

## 2021-11-11 ENCOUNTER — TELEMEDICINE (OUTPATIENT)
Dept: FAMILY MEDICINE CLINIC | Facility: CLINIC | Age: 51
End: 2021-11-11

## 2021-11-11 VITALS — BODY MASS INDEX: 30.39 KG/M2 | WEIGHT: 178 LBS | HEIGHT: 64 IN | TEMPERATURE: 98.1 F

## 2021-11-11 VITALS
OXYGEN SATURATION: 98 % | BODY MASS INDEX: 30.39 KG/M2 | HEIGHT: 64 IN | TEMPERATURE: 97.4 F | WEIGHT: 178 LBS | RESPIRATION RATE: 16 BRPM | HEART RATE: 77 BPM

## 2021-11-11 DIAGNOSIS — J02.9 SORE THROAT: Primary | ICD-10-CM

## 2021-11-11 DIAGNOSIS — J02.9 THROAT SORENESS: Primary | ICD-10-CM

## 2021-11-11 DIAGNOSIS — J03.90 TONSILLITIS WITH EXUDATE: ICD-10-CM

## 2021-11-11 LAB — S PYO AG THROAT QL: NEGATIVE

## 2021-11-11 PROCEDURE — U0005 INFEC AGEN DETEC AMPLI PROBE: HCPCS | Performed by: PHYSICIAN ASSISTANT

## 2021-11-11 PROCEDURE — G0382 LEV 3 HOSP TYPE B ED VISIT: HCPCS | Performed by: PHYSICIAN ASSISTANT

## 2021-11-11 PROCEDURE — 87880 STREP A ASSAY W/OPTIC: CPT | Performed by: PHYSICIAN ASSISTANT

## 2021-11-11 PROCEDURE — U0003 INFECTIOUS AGENT DETECTION BY NUCLEIC ACID (DNA OR RNA); SEVERE ACUTE RESPIRATORY SYNDROME CORONAVIRUS 2 (SARS-COV-2) (CORONAVIRUS DISEASE [COVID-19]), AMPLIFIED PROBE TECHNIQUE, MAKING USE OF HIGH THROUGHPUT TECHNOLOGIES AS DESCRIBED BY CMS-2020-01-R: HCPCS | Performed by: PHYSICIAN ASSISTANT

## 2021-11-11 PROCEDURE — 87070 CULTURE OTHR SPECIMN AEROBIC: CPT | Performed by: PHYSICIAN ASSISTANT

## 2021-11-11 RX ORDER — AMOXICILLIN 500 MG/1
500 CAPSULE ORAL EVERY 12 HOURS SCHEDULED
Qty: 20 CAPSULE | Refills: 0 | Status: SHIPPED | OUTPATIENT
Start: 2021-11-11 | End: 2021-11-21

## 2021-11-12 ENCOUNTER — TELEPHONE (OUTPATIENT)
Dept: ADMINISTRATIVE | Facility: OTHER | Age: 51
End: 2021-11-12

## 2021-11-12 LAB — SARS-COV-2 RNA RESP QL NAA+PROBE: NEGATIVE

## 2021-11-14 LAB — BACTERIA THROAT CULT: NORMAL

## 2023-02-06 ENCOUNTER — OFFICE VISIT (OUTPATIENT)
Dept: FAMILY MEDICINE CLINIC | Facility: CLINIC | Age: 53
End: 2023-02-06

## 2023-02-06 VITALS
SYSTOLIC BLOOD PRESSURE: 168 MMHG | TEMPERATURE: 97.8 F | HEART RATE: 84 BPM | HEIGHT: 64 IN | OXYGEN SATURATION: 100 % | BODY MASS INDEX: 35.51 KG/M2 | WEIGHT: 208 LBS | DIASTOLIC BLOOD PRESSURE: 94 MMHG

## 2023-02-06 DIAGNOSIS — I10 ESSENTIAL HYPERTENSION: ICD-10-CM

## 2023-02-06 DIAGNOSIS — R01.1 SYSTOLIC MURMUR: ICD-10-CM

## 2023-02-06 DIAGNOSIS — Z00.00 ANNUAL PHYSICAL EXAM: Primary | ICD-10-CM

## 2023-02-06 DIAGNOSIS — Z13.0 SCREENING, ANEMIA, DEFICIENCY, IRON: ICD-10-CM

## 2023-02-06 DIAGNOSIS — Z13.29 SCREENING FOR THYROID DISORDER: ICD-10-CM

## 2023-02-06 DIAGNOSIS — Z12.11 SCREENING FOR COLON CANCER: ICD-10-CM

## 2023-02-06 DIAGNOSIS — Z12.31 ENCOUNTER FOR SCREENING MAMMOGRAM FOR MALIGNANT NEOPLASM OF BREAST: ICD-10-CM

## 2023-02-06 DIAGNOSIS — Z13.220 SCREENING, LIPID: ICD-10-CM

## 2023-02-06 DIAGNOSIS — Z13.1 SCREENING FOR DIABETES MELLITUS: ICD-10-CM

## 2023-02-06 RX ORDER — AMLODIPINE BESYLATE AND BENAZEPRIL HYDROCHLORIDE 5; 10 MG/1; MG/1
1 CAPSULE ORAL DAILY
Qty: 90 CAPSULE | Refills: 1 | Status: SHIPPED | OUTPATIENT
Start: 2023-02-06

## 2023-02-06 NOTE — PATIENT INSTRUCTIONS
Cologuard kit will be mailed to your house, you can complete and mail back to them  Increase fluids  Restart blood pressure medication  Check fasting labs  Schedule with mammogram  Wellness Visit for Adults   AMBULATORY CARE:   A wellness visit  is when you see your healthcare provider to get screened for health problems  Your healthcare provider will also give you advice on how to stay healthy  Write down your questions so you remember to ask them  Ask your healthcare provider how often you should have a wellness visit  What happens at a wellness visit:  Your healthcare provider will ask about your health, and your family history of health problems  This includes high blood pressure, heart disease, and cancer  He or she will ask if you have symptoms that concern you, if you smoke, and about your mood  You may also be asked about your intake of medicines, supplements, food, and alcohol  Any of the following may be done: Your weight  will be checked  Your height may also be checked so your body mass index (BMI) can be calculated  Your BMI shows if you are at a healthy weight  Your blood pressure  and heart rate will be checked  Your temperature may also be checked  Blood and urine tests  may be done  Blood tests may be done to check your cholesterol levels  Abnormal cholesterol levels increase your risk for heart disease and stroke  You may also need a blood or urine test to check for diabetes if you are at increased risk  Urine tests may be done to look for signs of an infection or kidney disease  A physical exam  includes checking your heartbeat and lungs with a stethoscope  Your healthcare provider may also check your skin to look for sun damage  Screening tests  may be recommended  A screening test is done to check for diseases that may not cause symptoms  The screening tests you may need depend on your age, gender, family history, and lifestyle habits   For example, colorectal screening may be recommended if you are 48years old or older  Screening tests you need if you are a woman:   A Pap smear  is used to screen for cervical cancer  Pap smears are usually done every 3 to 5 years depending on your age  You may need them more often if you have had abnormal Pap smear test results in the past  Ask your healthcare provider how often you should have a Pap smear  A mammogram  is an x-ray of your breasts to screen for breast cancer  Experts recommend mammograms every 2 years starting at age 48 years  You may need a mammogram at age 52 years or younger if you have an increased risk for breast cancer  Talk to your healthcare provider about when you should start having mammograms and how often you need them  Vaccines you may need:   Get an influenza vaccine  every year  The influenza vaccine protects you from the flu  Several types of viruses cause the flu  The viruses change over time, so new vaccines are made each year  Get a tetanus-diphtheria (Td) booster vaccine  every 10 years  This vaccine protects you against tetanus and diphtheria  Tetanus is a severe infection that may cause painful muscle spasms and lockjaw  Diphtheria is a severe bacterial infection that causes a thick covering in the back of your mouth and throat  Get a human papillomavirus (HPV) vaccine  if you are female and aged 23 to 32 or male 23 to 24 and never received it  This vaccine protects you from HPV infection  HPV is the most common infection spread by sexual contact  HPV may also cause vaginal, penile, and anal cancers  Get a pneumococcal vaccine  if you are aged 72 years or older  The pneumococcal vaccine is an injection given to protect you from pneumococcal disease  Pneumococcal disease is an infection caused by pneumococcal bacteria  The infection may cause pneumonia, meningitis, or an ear infection  Get a shingles vaccine  if you are 60 or older, even if you have had shingles before   The shingles vaccine is an injection to protect you from the varicella-zoster virus  This is the same virus that causes chickenpox  Shingles is a painful rash that develops in people who had chickenpox or have been exposed to the virus  How to eat healthy:  My Plate is a model for planning healthy meals  It shows the types and amounts of foods that should go on your plate  Fruits and vegetables make up about half of your plate, and grains and protein make up the other half  A serving of dairy is included on the side of your plate  The amount of calories and serving sizes you need depends on your age, gender, weight, and height  Examples of healthy foods are listed below:  Eat a variety of vegetables  such as dark green, red, and orange vegetables  You can also include canned vegetables low in sodium (salt) and frozen vegetables without added butter or sauces  Eat a variety of fresh fruits , canned fruit in 100% juice, frozen fruit, and dried fruit  Include whole grains  At least half of the grains you eat should be whole grains  Examples include whole-wheat bread, wheat pasta, brown rice, and whole-grain cereals such as oatmeal     Eat a variety of protein foods such as seafood (fish and shellfish), lean meat, and poultry without skin (turkey and chicken)  Examples of lean meats include pork leg, shoulder, or tenderloin, and beef round, sirloin, tenderloin, and extra lean ground beef  Other protein foods include eggs and egg substitutes, beans, peas, soy products, nuts, and seeds  Choose low-fat dairy products such as skim or 1% milk or low-fat yogurt, cheese, and cottage cheese  Limit unhealthy fats  such as butter, hard margarine, and shortening  Exercise:  Exercise at least 30 minutes per day on most days of the week  Some examples of exercise include walking, biking, dancing, and swimming  You can also fit in more physical activity by taking the stairs instead of the elevator or parking farther away from stores  Include muscle strengthening activities 2 days each week  Regular exercise provides many health benefits  It helps you manage your weight, and decreases your risk for type 2 diabetes, heart disease, stroke, and high blood pressure  Exercise can also help improve your mood  Ask your healthcare provider about the best exercise plan for you  General health and safety guidelines:   Do not smoke  Nicotine and other chemicals in cigarettes and cigars can cause lung damage  Ask your healthcare provider for information if you currently smoke and need help to quit  E-cigarettes or smokeless tobacco still contain nicotine  Talk to your healthcare provider before you use these products  Limit alcohol  A drink of alcohol is 12 ounces of beer, 5 ounces of wine, or 1½ ounces of liquor  Lose weight, if needed  Being overweight increases your risk of certain health conditions  These include heart disease, high blood pressure, type 2 diabetes, and certain types of cancer  Protect your skin  Do not sunbathe or use tanning beds  Use sunscreen with a SPF 15 or higher  Apply sunscreen at least 15 minutes before you go outside  Reapply sunscreen every 2 hours  Wear protective clothing, hats, and sunglasses when you are outside  Drive safely  Always wear your seatbelt  Make sure everyone in your car wears a seatbelt  A seatbelt can save your life if you are in an accident  Do not use your cell phone when you are driving  This could distract you and cause an accident  Pull over if you need to make a call or send a text message  Practice safe sex  Use latex condoms if are sexually active and have more than one partner  Your healthcare provider may recommend screening tests for sexually transmitted infections (STIs)  Wear helmets, lifejackets, and protective gear  Always wear a helmet when you ride a bike or motorcycle, go skiing, or play sports that could cause a head injury   Wear protective equipment when you play sports  Wear a lifejacket when you are on a boat or doing water sports  © Copyright [employer] 2022 Information is for End User's use only and may not be sold, redistributed or otherwise used for commercial purposes  All illustrations and images included in CareNotes® are the copyrighted property of [employer]  or Talya Urbina   The above information is an  only  It is not intended as medical advice for individual conditions or treatments  Talk to your doctor, nurse or pharmacist before following any medical regimen to see if it is safe and effective for you

## 2023-02-06 NOTE — PROGRESS NOTES
[id number]    NAME: Maris Gallardo  AGE: 50 y o  SEX: female  : 1970     DATE: 2023     Assessment and Plan:     Problem List Items Addressed This Visit        Cardiovascular and Mediastinum    Essential hypertension     Restart amlodpine-benazepril  Check fasting labs  Encouraged smoking cessation  Decrease salt intake  Increase physical activity         Relevant Medications    amLODIPine-benazepril (LOTREL 5-10) 5-10 MG per capsule       Other    Systolic murmur     stable        Other Visit Diagnoses     Annual physical exam    -  Primary    Screening for colon cancer        Relevant Orders    Cologuard    Encounter for screening mammogram for malignant neoplasm of breast        Relevant Orders    Mammo screening bilateral w 3d & cad    Screening for thyroid disorder        Relevant Orders    TSH, 3rd generation with Free T4 reflex    Screening, lipid        Relevant Orders    Lipid panel    Screening for diabetes mellitus        Relevant Orders    Comprehensive metabolic panel    Screening, anemia, deficiency, iron        Relevant Orders    CBC and differential          Immunizations and preventive care screenings were discussed with patient today  Appropriate education was printed on patient's after visit summary  Counseling:  Alcohol/drug use: discussed moderation in alcohol intake, the recommendations for healthy alcohol use, and avoidance of illicit drug use  Dental Health: discussed importance of regular tooth brushing, flossing, and dental visits  Injury prevention: discussed safety/seat belts, safety helmets, smoke detectors, carbon dioxide detectors, and smoking near bedding or upholstery  Sexual health: discussed sexually transmitted diseases, partner selection, use of condoms, avoidance of unintended pregnancy, and contraceptive alternatives    · Exercise: the importance of regular exercise/physical activity was discussed  Recommend exercise 3-5 times per week for at least 30 minutes  BMI Counseling: Body mass index is 35 7 kg/m²  The BMI is above normal  Nutrition recommendations include decreasing portion sizes, encouraging healthy choices of fruits and vegetables, decreasing fast food intake, consuming healthier snacks, limiting drinks that contain sugar, moderation in carbohydrate intake and reducing intake of cholesterol  Exercise recommendations include exercising 3-5 times per week and strength training exercises  Rationale for BMI follow-up plan is due to patient being overweight or obese  Depression Screening and Follow-up Plan: Patient was screened for depression during today's encounter  They screened negative with a PHQ-2 score of 1  No follow-ups on file  Chief Complaint:     Chief Complaint   Patient presents with   • Physical Exam     Physical dr alberts office blood pressure check       History of Present Illness:     Adult Annual Physical   Patient here for a comprehensive physical exam  The patient reports no problems  More sedentary job now,- has gained weight, now just sitting behind a desk  Started to watch diet intake, bloated, uncomfortable at times, sometimes belching, heartburn at night time- will use Prilosec at times  Diet and Physical Activity  · Diet/Nutrition: has started to eat healthier, cutting back on calories  · Exercise: no formal exercise  Depression Screening  PHQ-2/9 Depression Screening    Little interest or pleasure in doing things: 1 - several days  Feeling down, depressed, or hopeless: 0 - not at all  PHQ-2 Score: 1  PHQ-2 Interpretation: Negative depression screen       General Health  · Sleep: sleeps well  · Hearing: normal - bilateral   · Vision: goes for regular eye exams and wears glasses  · Dental: no dental visits for >1 year         /GYN Health  · Patient is: postmenopausal  · Last menstrual period: over a year ago     Review of Systems:     Review of Systems   Constitutional: Negative  Negative for chills and fever  HENT: Negative for ear pain and sore throat  Eyes: Negative for pain and visual disturbance  Respiratory: Negative  Negative for cough and shortness of breath  Cardiovascular: Negative  Negative for chest pain and palpitations  Gastrointestinal: Negative for abdominal pain, constipation, diarrhea and vomiting  Heartburn at times     Genitourinary: Negative  Negative for dysuria and hematuria  Musculoskeletal: Negative for arthralgias and back pain  Skin: Negative for color change and rash  Neurological: Negative  Negative for seizures and syncope  Hematological: Negative  Psychiatric/Behavioral: Negative  All other systems reviewed and are negative       Past Medical History:     Past Medical History:   Diagnosis Date   • Anxiety    • Chalazion of left eye 2017   • Depression    • Generalized anxiety disorder 2013   • Hypertension    • Lateral epicondylitis of right elbow 2017      Past Surgical History:     Past Surgical History:   Procedure Laterality Date   • BREAST BIOPSY Left     benign   •  SECTION     • EXCISIONAL HEMORRHOIDECTOMY     • HEMORRHOID SURGERY        Social History:     Social History     Socioeconomic History   • Marital status:      Spouse name: None   • Number of children: None   • Years of education: None   • Highest education level: None   Occupational History   • None   Tobacco Use   • Smoking status: Every Day     Packs/day: 0 50     Years: 30 00     Pack years: 15 00     Types: Cigarettes   • Smokeless tobacco: Never   Vaping Use   • Vaping Use: Former   Substance and Sexual Activity   • Alcohol use: Yes     Comment: social   • Drug use: No   • Sexual activity: Yes     Partners: Male   Other Topics Concern   • None   Social History Narrative   • None     Social Determinants of Health     Financial Resource Strain: Not on file Food Insecurity: Not on file   Transportation Needs: Not on file   Physical Activity: Not on file   Stress: Not on file   Social Connections: Not on file   Intimate Partner Violence: Not on file   Housing Stability: Not on file      Family History:     Family History   Problem Relation Age of Onset   • Cancer Mother         bladder   • Diabetes Father    • Stroke Father       Current Medications:     Current Outpatient Medications   Medication Sig Dispense Refill   • amLODIPine-benazepril (LOTREL 5-10) 5-10 MG per capsule Take 1 capsule by mouth daily 90 capsule 1     No current facility-administered medications for this visit  Allergies:     No Known Allergies   Physical Exam:     /94   Pulse 84   Temp 97 8 °F (36 6 °C) (Tympanic)   Ht 5' 4" (1 626 m)   Wt 94 3 kg (208 lb)   LMP 06/17/2019 (Approximate)   SpO2 100%   BMI 35 70 kg/m²     Physical Exam  Vitals and nursing note reviewed  Constitutional:       General: She is not in acute distress  Appearance: Normal appearance  She is well-developed  She is obese  HENT:      Head: Normocephalic and atraumatic  Right Ear: Tympanic membrane, ear canal and external ear normal       Left Ear: Tympanic membrane, ear canal and external ear normal       Nose: Nose normal       Mouth/Throat:      Mouth: Mucous membranes are moist       Pharynx: Oropharynx is clear  Eyes:      Conjunctiva/sclera: Conjunctivae normal       Pupils: Pupils are equal, round, and reactive to light  Neck:      Thyroid: No thyromegaly or thyroid tenderness  Cardiovascular:      Rate and Rhythm: Normal rate and regular rhythm  Pulses:           Radial pulses are 2+ on the right side and 2+ on the left side  Heart sounds: Murmur heard  Pulmonary:      Effort: Pulmonary effort is normal  No respiratory distress  Breath sounds: Normal breath sounds  Abdominal:      General: Bowel sounds are normal       Palpations: Abdomen is soft  Tenderness: There is no abdominal tenderness  Musculoskeletal:      Cervical back: Neck supple  Right lower leg: No edema  Left lower leg: No edema  Skin:     General: Skin is warm and dry  Findings: No rash  Neurological:      Mental Status: She is alert and oriented to person, place, and time     Psychiatric:         Mood and Affect: Mood normal          Behavior: Behavior normal           Nathalie Rojas, [address] [facility]

## 2023-02-07 NOTE — ASSESSMENT & PLAN NOTE
Restart amlodpine-benazepril  Check fasting labs  Encouraged smoking cessation  Decrease salt intake  Increase physical activity

## 2023-02-26 LAB
ALBUMIN SERPL-MCNC: 4.5 G/DL (ref 3.6–5.1)
ALBUMIN/GLOB SERPL: 2 (CALC) (ref 1–2.5)
ALP SERPL-CCNC: 51 U/L (ref 37–153)
ALT SERPL-CCNC: 25 U/L (ref 6–29)
AST SERPL-CCNC: 15 U/L (ref 10–35)
BASOPHILS # BLD AUTO: 79 CELLS/UL (ref 0–200)
BASOPHILS NFR BLD AUTO: 1 %
BILIRUB SERPL-MCNC: 0.6 MG/DL (ref 0.2–1.2)
BUN SERPL-MCNC: 12 MG/DL (ref 7–25)
BUN/CREAT SERPL: NORMAL (CALC) (ref 6–22)
CALCIUM SERPL-MCNC: 9.7 MG/DL (ref 8.6–10.4)
CHLORIDE SERPL-SCNC: 105 MMOL/L (ref 98–110)
CHOLEST SERPL-MCNC: 314 MG/DL
CHOLEST/HDLC SERPL: 8.1 (CALC)
CO2 SERPL-SCNC: 28 MMOL/L (ref 20–32)
CREAT SERPL-MCNC: 0.63 MG/DL (ref 0.5–1.03)
EOSINOPHIL # BLD AUTO: 308 CELLS/UL (ref 15–500)
EOSINOPHIL NFR BLD AUTO: 3.9 %
ERYTHROCYTE [DISTWIDTH] IN BLOOD BY AUTOMATED COUNT: 12.8 % (ref 11–15)
GFR/BSA.PRED SERPLBLD CYS-BASED-ARV: 108 ML/MIN/1.73M2
GLOBULIN SER CALC-MCNC: 2.2 G/DL (CALC) (ref 1.9–3.7)
GLUCOSE SERPL-MCNC: 96 MG/DL (ref 65–99)
HCT VFR BLD AUTO: 43.6 % (ref 35–45)
HDLC SERPL-MCNC: 39 MG/DL
HGB BLD-MCNC: 14.8 G/DL (ref 11.7–15.5)
LDLC SERPL CALC-MCNC: 237 MG/DL (CALC)
LYMPHOCYTES # BLD AUTO: 2331 CELLS/UL (ref 850–3900)
LYMPHOCYTES NFR BLD AUTO: 29.5 %
MCH RBC QN AUTO: 31 PG (ref 27–33)
MCHC RBC AUTO-ENTMCNC: 33.9 G/DL (ref 32–36)
MCV RBC AUTO: 91.2 FL (ref 80–100)
MONOCYTES # BLD AUTO: 664 CELLS/UL (ref 200–950)
MONOCYTES NFR BLD AUTO: 8.4 %
NEUTROPHILS # BLD AUTO: 4519 CELLS/UL (ref 1500–7800)
NEUTROPHILS NFR BLD AUTO: 57.2 %
NONHDLC SERPL-MCNC: 275 MG/DL (CALC)
PLATELET # BLD AUTO: 374 THOUSAND/UL (ref 140–400)
PMV BLD REES-ECKER: 10 FL (ref 7.5–12.5)
POTASSIUM SERPL-SCNC: 5.1 MMOL/L (ref 3.5–5.3)
PROT SERPL-MCNC: 6.7 G/DL (ref 6.1–8.1)
RBC # BLD AUTO: 4.78 MILLION/UL (ref 3.8–5.1)
SODIUM SERPL-SCNC: 140 MMOL/L (ref 135–146)
TRIGL SERPL-MCNC: 200 MG/DL
TSH SERPL-ACNC: 0.79 MIU/L
WBC # BLD AUTO: 7.9 THOUSAND/UL (ref 3.8–10.8)

## 2023-02-27 NOTE — RESULT ENCOUNTER NOTE
Your cholesterol levels have worsened  Other remaining labs are normal  We can discuss at your office visit next week about statin therapy

## 2023-03-07 ENCOUNTER — OFFICE VISIT (OUTPATIENT)
Dept: FAMILY MEDICINE CLINIC | Facility: CLINIC | Age: 53
End: 2023-03-07

## 2023-03-07 VITALS
WEIGHT: 202.6 LBS | TEMPERATURE: 97.6 F | HEART RATE: 81 BPM | SYSTOLIC BLOOD PRESSURE: 128 MMHG | BODY MASS INDEX: 34.78 KG/M2 | OXYGEN SATURATION: 97 % | DIASTOLIC BLOOD PRESSURE: 78 MMHG

## 2023-03-07 DIAGNOSIS — J45.990 EXERCISE-INDUCED ASTHMA: ICD-10-CM

## 2023-03-07 DIAGNOSIS — I10 ESSENTIAL HYPERTENSION: ICD-10-CM

## 2023-03-07 DIAGNOSIS — E78.49 OTHER HYPERLIPIDEMIA: Primary | ICD-10-CM

## 2023-03-07 RX ORDER — ALBUTEROL SULFATE 90 UG/1
2 AEROSOL, METERED RESPIRATORY (INHALATION) EVERY 6 HOURS PRN
Qty: 8.5 G | Refills: 1 | Status: SHIPPED | OUTPATIENT
Start: 2023-03-07

## 2023-03-07 NOTE — PATIENT INSTRUCTIONS
Check Lipids in 4 months if still elevated in will start statin therapy  Albuterol inhaler as needed before exercise

## 2023-03-07 NOTE — ASSESSMENT & PLAN NOTE
Check fasting labs by July to recheck lipid panel  Follow up in 4 months  Mediterranean diet  Continue weight loss

## 2023-03-07 NOTE — PROGRESS NOTES
Name: Dusty Bernstein      : 1970      MRN: [id number]  Encounter Provider: GILLIAN Zaragoza  Encounter Date: 3/7/2023   Encounter department: [facility]     1  Other hyperlipidemia  Assessment & Plan:  Check fasting labs by July to recheck lipid panel  Follow up in 4 months  Mediterranean diet  Continue weight loss    Orders:  -     Lipid panel; Future  -     Lipid panel    2  Exercise-induced asthma  Assessment & Plan:  Albuterol inhaler 2 puffs 20 minutes before exercise    Orders:  -     albuterol (Proventil HFA) 90 mcg/act inhaler; Inhale 2 puffs every 6 (six) hours as needed (before exercise)    3  Essential hypertension  Assessment & Plan:  Great improvement  Continue current medication regimen             Subjective      Here today for BP check and review labs  Restarted benazepril-amlodipine  Has started to exercise and change dietary patterns  Labs 23 all normal except , HDL 39, ,   Has lost 6lbs since last office  Has a cough when she exercises  Doesn't have an inhaler anymore would sometimes get inhalers with illness  cologuard and mammogram pending               Review of Systems   Constitutional: Negative  Negative for chills and fever  HENT: Negative for ear pain and sore throat  Eyes: Negative for pain and visual disturbance  Respiratory: Negative for chest tightness  Cough/wheeze with exercise     Cardiovascular: Negative  Negative for chest pain, palpitations and leg swelling  Gastrointestinal: Negative for abdominal pain, constipation, diarrhea and vomiting  Heartburn at times     Genitourinary: Negative  Negative for dysuria and hematuria  Musculoskeletal: Negative for arthralgias and back pain  Skin: Negative for color change and rash  Neurological: Negative  Negative for dizziness, seizures, syncope and light-headedness  Hematological: Negative  Psychiatric/Behavioral: Negative  All other systems reviewed and are negative  Current Outpatient Medications on File Prior to Visit   Medication Sig   • amLODIPine-benazepril (LOTREL 5-10) 5-10 MG per capsule Take 1 capsule by mouth daily       Objective     /78   Pulse 81   Temp 97 6 °F (36 4 °C)   Wt 91 9 kg (202 lb 9 6 oz)   LMP 06/17/2019 (Approximate)   SpO2 97%   BMI 34 78 kg/m²     Physical Exam  Vitals reviewed  Constitutional:       Appearance: Normal appearance  HENT:      Head: Normocephalic  Eyes:      Conjunctiva/sclera: Conjunctivae normal       Pupils: Pupils are equal, round, and reactive to light  Cardiovascular:      Rate and Rhythm: Normal rate and regular rhythm  Heart sounds: Murmur heard  Pulmonary:      Effort: Pulmonary effort is normal  No respiratory distress  Breath sounds: Normal breath sounds  Abdominal:      Palpations: Abdomen is soft  Musculoskeletal:      Right lower leg: No edema  Left lower leg: No edema  Lymphadenopathy:      Cervical: No cervical adenopathy  Neurological:      Mental Status: She is alert and oriented to person, place, and time     Psychiatric:         Mood and Affect: Mood normal          Behavior: Behavior normal        Donn Friend

## 2023-03-20 ENCOUNTER — TELEPHONE (OUTPATIENT)
Dept: OBGYN CLINIC | Facility: CLINIC | Age: 53
End: 2023-03-20

## 2023-03-20 NOTE — TELEPHONE ENCOUNTER
Pt states she started experiencing bleeding yesterday, has been enough to wear a pad  Pt states today sitting at desk has noticed some cramping  Pt states has been more than 1 year since last menses  Appt offered and scheduled  Pt aware of possible biopsy and verbalizes understanding of ibuprofen instructions prior to appt

## 2023-03-21 ENCOUNTER — PROCEDURE VISIT (OUTPATIENT)
Dept: OBGYN CLINIC | Facility: CLINIC | Age: 53
End: 2023-03-21

## 2023-03-21 VITALS
DIASTOLIC BLOOD PRESSURE: 84 MMHG | SYSTOLIC BLOOD PRESSURE: 138 MMHG | BODY MASS INDEX: 34.42 KG/M2 | HEIGHT: 64 IN | WEIGHT: 201.6 LBS

## 2023-03-21 DIAGNOSIS — N95.0 POSTMENOPAUSAL BLEEDING: Primary | ICD-10-CM

## 2023-03-21 RX ORDER — MEDROXYPROGESTERONE ACETATE 10 MG/1
10 TABLET ORAL DAILY
Qty: 10 TABLET | Refills: 0 | Status: SHIPPED | OUTPATIENT
Start: 2023-03-21 | End: 2023-03-31

## 2023-03-21 NOTE — PROGRESS NOTES
Yoshi Navarro 50year-old complains of vaginal bleeding  She has been menopausal for about since   She is not on HRT  She states she started bleeding on   Bleeding is described as bright red , but light  She is still currently bleeding, not filling a pad  Other menopausal symptoms include: none  Workup to date: none  Menstrual History:  OB History        3    Para   2    Term   2            AB   1    Living   2       SAB   1    IAB        Ectopic        Multiple        Live Births   2           Obstetric Comments   : SAB - naturally; 18 primary LTCS M;  repeat LTCS           Patient's last menstrual period was 2023 (exact date)  Endometrial biopsy    Date/Time: 3/21/2023 7:46 AM  Performed by: GILLIAN Mahajan  Authorized by: GILLIAN Mahajan   Universal Protocol:  Consent: Verbal consent obtained  Risks and benefits: risks, benefits and alternatives were discussed  Consent given by: patient  Timeout called at: 3/21/2023 7:46 AM   Patient understanding: patient states understanding of the procedure being performed  Patient identity confirmed: verbally with patient      Indication:     Indications: Post-menopausal bleeding      Progression of post-menopausal bleeding:  Unchanged  Procedure:     Procedure: endometrial biopsy with Pipelle      The cervix was dilated: no      Uterus sounded: yes      Uterus sound depth (cm):  7    Specimen collected: specimen collected and sent to pathology    Findings:     Uterus size:  6-8 weeks    Cervix: normal      Adnexa: normal          Diagnoses and all orders for this visit:    Postmenopausal bleeding  -     Endometrial biopsy  -     US pelvis complete w transvaginal; Future  -     medroxyPROGESTERone (PROVERA) 10 mg tablet; Take 1 tablet (10 mg total) by mouth daily for 10 days      Patient to initiate Provera 10 mg QD x 10 days  This will help stop the current bleeding     She is to schedule the pelvic US to valuate the thickness of the endometrial lining  • For the ONE week, do not insert anything into your vagina  This includes tampons, douching, and sexual intercourse  • Resume usual activities after leaving today  • You may shower  Avoid hot tubs or swimming for one week  • You may have mild cramping in your lower abdomen for one to two days  You may take Ibuprofen such as Advil, Motrin, Aleve or Tylenol for cramping  • You may experience some dark or bright red spotting/discharge for one to two days  This may last for up to one week  Use pads only  Call office if you experience heavy bleeding, filling a pad or more an hour for 2 hours in a row  Persistent severe pain or cramping after 72 hours or if you develop a fever of 100 4 or greater  All questions and concerns addressed and patient verbalized understanding  Results will be released to [facility], if abnormal will call to review and discuss treatment plan

## 2023-03-25 ENCOUNTER — HOSPITAL ENCOUNTER (OUTPATIENT)
Dept: RADIOLOGY | Facility: HOSPITAL | Age: 53
Discharge: HOME | End: 2023-03-25

## 2023-03-25 DIAGNOSIS — N95.0 POSTMENOPAUSAL BLEEDING: ICD-10-CM

## 2023-04-03 ENCOUNTER — TELEPHONE (OUTPATIENT)
Dept: OBGYN CLINIC | Facility: CLINIC | Age: 53
End: 2023-04-03

## 2023-04-03 NOTE — TELEPHONE ENCOUNTER
Pt lmom - had an ultrasound done over one week ago and still waiting for results  Experiencing heaviness in lower abdomen, is uncomfortable

## 2023-04-03 NOTE — TELEPHONE ENCOUNTER
Pt states she was spotting then stopped and 2 wks later started spotting again, this past Sunday, red in color  Pt reports a heavy cramping feeling noted  Pt aware results not received in system, will reach out to [address] [place] [address] Radiology to release report for provider to review

## 2023-09-20 DIAGNOSIS — I10 ESSENTIAL HYPERTENSION: ICD-10-CM

## 2023-09-20 RX ORDER — AMLODIPINE BESYLATE AND BENAZEPRIL HYDROCHLORIDE 5; 10 MG/1; MG/1
1 CAPSULE ORAL DAILY
Qty: 90 CAPSULE | Refills: 0 | Status: SHIPPED | OUTPATIENT
Start: 2023-09-20

## 2023-09-20 NOTE — TELEPHONE ENCOUNTER
Hello, my name is Trinidad Joyce. My phone number is [phone]. I would like to have my blood pressure medicine refilled. I know I need to do my blood work. I'm going on the 28th. I thought I had one more refill and I don't and I'm going to be out in about 3 days. If you could please let me know that this can be done and I'm going on the 28th to have my blood work done. Thank you.       Refills have been requested for the following medications:         amLODIPine-benazepril (LOTREL 5-10) 5-10 MG per capsule Adriana Majano     Preferred pharmacy: [id number] - [place] - [address].

## 2023-12-19 DIAGNOSIS — I10 ESSENTIAL HYPERTENSION: ICD-10-CM

## 2023-12-19 RX ORDER — AMLODIPINE BESYLATE AND BENAZEPRIL HYDROCHLORIDE 5; 10 MG/1; MG/1
1 CAPSULE ORAL DAILY
Qty: 90 CAPSULE | Refills: 0 | Status: SHIPPED | OUTPATIENT
Start: 2023-12-19

## 2023-12-19 NOTE — TELEPHONE ENCOUNTER
Called Pt. Spoke to Pt, and Pt advised on medication refill, patient scheduled her physical for   Monday Feb 12, 2024   Arrive by 3:45 PM

## 2024-02-12 ENCOUNTER — OFFICE VISIT (OUTPATIENT)
Dept: FAMILY MEDICINE CLINIC | Facility: CLINIC | Age: 54
End: 2024-02-12
Payer: COMMERCIAL

## 2024-02-12 VITALS
BODY MASS INDEX: 33.99 KG/M2 | HEART RATE: 84 BPM | OXYGEN SATURATION: 98 % | HEIGHT: 64 IN | SYSTOLIC BLOOD PRESSURE: 132 MMHG | DIASTOLIC BLOOD PRESSURE: 82 MMHG | TEMPERATURE: 97.5 F | WEIGHT: 199.12 LBS

## 2024-02-12 DIAGNOSIS — Z00.00 ANNUAL PHYSICAL EXAM: Primary | ICD-10-CM

## 2024-02-12 DIAGNOSIS — Z12.31 ENCOUNTER FOR SCREENING MAMMOGRAM FOR BREAST CANCER: ICD-10-CM

## 2024-02-12 DIAGNOSIS — Z13.220 SCREENING, LIPID: ICD-10-CM

## 2024-02-12 DIAGNOSIS — F17.210 CIGARETTE NICOTINE DEPENDENCE WITHOUT COMPLICATION: ICD-10-CM

## 2024-02-12 DIAGNOSIS — Z13.0 SCREENING, ANEMIA, DEFICIENCY, IRON: ICD-10-CM

## 2024-02-12 DIAGNOSIS — Z12.11 SCREENING FOR MALIGNANT NEOPLASM OF COLON: ICD-10-CM

## 2024-02-12 DIAGNOSIS — Z13.29 SCREENING FOR THYROID DISORDER: ICD-10-CM

## 2024-02-12 DIAGNOSIS — E78.49 OTHER HYPERLIPIDEMIA: ICD-10-CM

## 2024-02-12 DIAGNOSIS — F32.0 MILD MAJOR DEPRESSION (HCC): ICD-10-CM

## 2024-02-12 DIAGNOSIS — Z13.1 SCREENING FOR DIABETES MELLITUS: ICD-10-CM

## 2024-02-12 DIAGNOSIS — J45.990 EXERCISE-INDUCED ASTHMA: ICD-10-CM

## 2024-02-12 DIAGNOSIS — I10 ESSENTIAL HYPERTENSION: ICD-10-CM

## 2024-02-12 PROCEDURE — 99396 PREV VISIT EST AGE 40-64: CPT | Performed by: NURSE PRACTITIONER

## 2024-02-12 RX ORDER — AMLODIPINE BESYLATE AND BENAZEPRIL HYDROCHLORIDE 5; 10 MG/1; MG/1
1 CAPSULE ORAL DAILY
Qty: 90 CAPSULE | Refills: 1 | Status: SHIPPED | OUTPATIENT
Start: 2024-02-12

## 2024-02-12 RX ORDER — ALBUTEROL SULFATE 90 UG/1
2 AEROSOL, METERED RESPIRATORY (INHALATION) EVERY 6 HOURS PRN
Qty: 8.5 G | Refills: 1 | Status: SHIPPED | OUTPATIENT
Start: 2024-02-12

## 2024-02-12 RX ORDER — BUPROPION HYDROCHLORIDE 150 MG/1
150 TABLET, EXTENDED RELEASE ORAL 2 TIMES DAILY
Qty: 60 TABLET | Refills: 2 | Status: SHIPPED | OUTPATIENT
Start: 2024-02-12

## 2024-02-12 NOTE — PROGRESS NOTES
ADULT ANNUAL PHYSICAL  [facility]    NAME: Vanna Chu  AGE: 53 y.o. SEX: female  : 1970     DATE: 2024     Assessment and Plan:     Problem List Items Addressed This Visit        Respiratory    Exercise-induced asthma    Relevant Medications    albuterol (Proventil HFA) 90 mcg/act inhaler       Cardiovascular and Mediastinum    Essential hypertension     Stable, continue lotrel daily         Relevant Medications    amLODIPine-benazepril (LOTREL 5-10) 5-10 MG per capsule       Other    Other hyperlipidemia     Check fasting labs end of February  Mediterranean diet  Continue weight loss         Mild major depression (HCC)     Start wellbutrin once daily for 5-7 days, then increase to twice daily  Discussed side effects of medication           Relevant Medications    buPROPion (Wellbutrin SR) 150 mg 12 hr tablet    Cigarette nicotine dependence without complication     Start wellbutrin         Relevant Medications    buPROPion (Wellbutrin SR) 150 mg 12 hr tablet   Other Visit Diagnoses     Annual physical exam    -  Primary    Encounter for screening mammogram for breast cancer        Relevant Orders    Mammo screening bilateral w 3d & cad    Screening for malignant neoplasm of colon        Relevant Orders    Ambulatory Referral to Gastroenterology    Screening for thyroid disorder        Relevant Orders    TSH, 3rd generation with Free T4 reflex    Screening, lipid        Relevant Orders    Lipid Panel with Direct LDL reflex    Screening for diabetes mellitus        Relevant Orders    Comprehensive metabolic panel    Screening, anemia, deficiency, iron        Relevant Orders    CBC and differential          Immunizations and preventive care screenings were discussed with patient today. Appropriate education was printed on patient's after visit summary.    Counseling:  Alcohol/drug use: discussed moderation in alcohol intake, the recommendations for  healthy alcohol use, and avoidance of illicit drug use.  Dental Health: discussed importance of regular tooth brushing, flossing, and dental visits.  Injury prevention: discussed safety/seat belts, safety helmets, smoke detectors, carbon dioxide detectors, and smoking near bedding or upholstery.  Exercise: the importance of regular exercise/physical activity was discussed. Recommend exercise 3-5 times per week for at least 30 minutes.       Tobacco Cessation Counseling: Tobacco cessation counseling was provided. The patient is sincerely urged to quit consumption of tobacco. She is ready to quit tobacco. Medication options and side effects of medication discussed. Patient agreed to medication. Bupropion SR was prescribed.         No follow-ups on file.     Chief Complaint:     Chief Complaint   Patient presents with   • Physical Exam     Annual Physical last completed 2/6/23  PHQ 2   Blood work and other HM pended   142/90 BP recheck       History of Present Illness:     Adult Annual Physical   Patient here for a comprehensive physical exam. The patient reports problems - depression, depressed mood, no suicdial thoughts. Wants to quit smoking  .    Diet and Physical Activity  Diet/Nutrition:  tries to make healthy choices .   Exercise: no formal exercise.      Depression Screening  PHQ-2/9 Depression Screening    Little interest or pleasure in doing things: 1 - several days  Feeling down, depressed, or hopeless: 1 - several days  PHQ-2 Score: 2  PHQ-2 Interpretation: Negative depression screen       General Health  Sleep: sleeps well.   Hearing: normal - bilateral.  Vision: goes for regular eye exams.   Dental: regular dental visits.       /GYN Health  Follows with gynecology? yes   Patient is: postmenopausal     Review of Systems:     Review of Systems   Constitutional: Negative.  Negative for chills and fever.   HENT:  Negative for ear pain and sore throat.    Eyes:  Negative for pain and visual disturbance.    Respiratory: Negative.  Negative for cough and shortness of breath.    Cardiovascular: Negative.  Negative for chest pain and palpitations.   Gastrointestinal:  Negative for abdominal pain, constipation, diarrhea and vomiting.   Genitourinary: Negative.  Negative for dysuria and hematuria.   Musculoskeletal:  Negative for arthralgias and back pain.   Skin:  Negative for color change and rash.   Neurological: Negative.  Negative for seizures and syncope.   Hematological: Negative.    Psychiatric/Behavioral:  Positive for dysphoric mood. Negative for suicidal ideas.    All other systems reviewed and are negative.     Past Medical History:     Past Medical History:   Diagnosis Date   • Anxiety    • Chalazion of left eye 2017   • Depression    • Generalized anxiety disorder 2013   • Heart murmur    • Hypertension    • Lateral epicondylitis of right elbow 2017   • Visual impairment       Past Surgical History:     Past Surgical History:   Procedure Laterality Date   • BREAST BIOPSY Left     benign   •  SECTION     • EXCISIONAL HEMORRHOIDECTOMY     • HEMORRHOID SURGERY        Social History:     Social History     Socioeconomic History   • Marital status:      Spouse name: None   • Number of children: None   • Years of education: None   • Highest education level: None   Occupational History   • None   Tobacco Use   • Smoking status: Every Day     Current packs/day: 0.50     Average packs/day: 0.5 packs/day for 15.0 years (7.5 ttl pk-yrs)     Types: Cigarettes   • Smokeless tobacco: Former   Vaping Use   • Vaping status: Former   Substance and Sexual Activity   • Alcohol use: Yes     Comment: social   • Drug use: No   • Sexual activity: Not Currently     Partners: Male   Other Topics Concern   • None   Social History Narrative   • None     Social Determinants of Health     Financial Resource Strain: Not on file   Food Insecurity: Not on file   Transportation Needs: Not on file  "  Physical Activity: Not on file   Stress: Not on file   Social Connections: Not on file   Intimate Partner Violence: Not on file   Housing Stability: Not on file      Family History:     Family History   Problem Relation Age of Onset   • Cancer Mother         Bladder   • Bipolar disorder Mother    • Diabetes Father    • Stroke Father       Current Medications:     Current Outpatient Medications   Medication Sig Dispense Refill   • albuterol (Proventil HFA) 90 mcg/act inhaler Inhale 2 puffs every 6 (six) hours as needed (before exercise) 8.5 g 1   • amLODIPine-benazepril (LOTREL 5-10) 5-10 MG per capsule Take 1 capsule by mouth daily 90 capsule 1   • buPROPion (Wellbutrin SR) 150 mg 12 hr tablet Take 1 tablet (150 mg total) by mouth 2 (two) times a day 60 tablet 2     No current facility-administered medications for this visit.      Allergies:     No Known Allergies   Physical Exam:     /82   Pulse 84   Temp 97.5 °F (36.4 °C)   Ht 5' 4\" (1.626 m)   Wt 90.3 kg (199 lb 1.9 oz)   SpO2 98%   BMI 34.18 kg/m²     Physical Exam  Vitals and nursing note reviewed.   Constitutional:       General: She is not in acute distress.     Appearance: Normal appearance. She is well-developed. She is obese.   HENT:      Head: Normocephalic and atraumatic.      Right Ear: Tympanic membrane, ear canal and external ear normal.      Left Ear: Tympanic membrane, ear canal and external ear normal.      Nose: Nose normal.      Mouth/Throat:      Mouth: Mucous membranes are moist.      Pharynx: Oropharynx is clear.   Eyes:      Conjunctiva/sclera: Conjunctivae normal.      Pupils: Pupils are equal, round, and reactive to light.   Neck:      Thyroid: No thyromegaly or thyroid tenderness.   Cardiovascular:      Rate and Rhythm: Normal rate and regular rhythm.      Pulses:           Radial pulses are 2+ on the right side and 2+ on the left side.      Heart sounds: Murmur heard.   Pulmonary:      Effort: Pulmonary effort is normal. No " respiratory distress.      Breath sounds: Normal breath sounds.   Abdominal:      General: Bowel sounds are normal.      Palpations: Abdomen is soft.      Tenderness: There is no abdominal tenderness.   Musculoskeletal:      Cervical back: Neck supple.      Right lower leg: No edema.      Left lower leg: No edema.   Skin:     General: Skin is warm and dry.      Findings: No rash.   Neurological:      Mental Status: She is alert and oriented to person, place, and time.   Psychiatric:         Mood and Affect: Mood normal.         Behavior: Behavior normal.          GILLIAN Luo  [facility]

## 2024-02-13 PROBLEM — F17.210 CIGARETTE NICOTINE DEPENDENCE WITHOUT COMPLICATION: Status: ACTIVE | Noted: 2024-02-13

## 2024-02-13 PROBLEM — F32.0 MILD MAJOR DEPRESSION (HCC): Status: ACTIVE | Noted: 2024-02-13

## 2024-02-13 NOTE — ASSESSMENT & PLAN NOTE
Start wellbutrin once daily for 5-7 days, then increase to twice daily  Discussed side effects of medication

## 2024-02-21 PROBLEM — Z01.419 GYNECOLOGIC EXAM NORMAL: Status: RESOLVED | Noted: 2018-05-29 | Resolved: 2024-02-21

## 2024-05-01 DIAGNOSIS — N85.00 ENDOMETRIAL HYPERPLASIA: Primary | ICD-10-CM

## 2024-07-12 ENCOUNTER — OFFICE VISIT (OUTPATIENT)
Dept: FAMILY MEDICINE CLINIC | Facility: CLINIC | Age: 54
End: 2024-07-12
Payer: COMMERCIAL

## 2024-07-12 VITALS
TEMPERATURE: 96.7 F | WEIGHT: 202 LBS | HEART RATE: 73 BPM | HEIGHT: 64 IN | DIASTOLIC BLOOD PRESSURE: 72 MMHG | BODY MASS INDEX: 34.49 KG/M2 | OXYGEN SATURATION: 99 % | SYSTOLIC BLOOD PRESSURE: 124 MMHG

## 2024-07-12 DIAGNOSIS — M25.562 ACUTE PAIN OF LEFT KNEE: Primary | ICD-10-CM

## 2024-07-12 PROCEDURE — 99213 OFFICE O/P EST LOW 20 MIN: CPT | Performed by: NURSE PRACTITIONER

## 2024-07-12 RX ORDER — MELOXICAM 15 MG/1
15 TABLET ORAL DAILY PRN
Qty: 30 TABLET | Refills: 0 | Status: SHIPPED | OUTPATIENT
Start: 2024-07-12

## 2024-07-12 NOTE — PATIENT INSTRUCTIONS
Rest, ice, compression when on feet, elevation  Meloxicam with food once daily  Do not take additional NSAIDs with medication

## 2024-07-12 NOTE — PROGRESS NOTES
"Ambulatory Visit  Name: Vanna Chu      : 1970      MRN: [id number]  Encounter Provider: GILLIAN Luo  Encounter Date: 2024   Encounter department: [facility]    Assessment & Plan   1. Acute pain of left knee  -     meloxicam (MOBIC) 15 mg tablet; Take 1 tablet (15 mg total) by mouth daily as needed for moderate pain     History of Present Illness     2 weeks ago had pain while she was squatting felt a pop in her knee. No swelling.   Twisting her knee this week and since then cannot stand or walk for periods.  Doesn't feel unstable  Feelsd a band before her knee cap  Has been taking motrin 800mg with food- every 6 hours   No numbness or tingling into left   No history of surgeries on the knee      Knee Pain   Pertinent negatives include no numbness.       Review of Systems   Constitutional: Negative.    Respiratory: Negative.     Cardiovascular: Negative.    Gastrointestinal: Negative.    Musculoskeletal:  Positive for arthralgias. Negative for joint swelling.   Neurological:  Negative for weakness and numbness.       Objective     /72   Pulse 73   Temp (!) 96.7 °F (35.9 °C) (Tympanic)   Ht 5' 4\" (1.626 m)   Wt 91.6 kg (202 lb)   SpO2 99%   BMI 34.67 kg/m²     Physical Exam  Vitals reviewed.   Constitutional:       General: She is not in acute distress.     Appearance: Normal appearance.   HENT:      Head: Normocephalic.   Cardiovascular:      Rate and Rhythm: Normal rate and regular rhythm.      Heart sounds: Murmur heard.   Pulmonary:      Breath sounds: Normal breath sounds.   Musculoskeletal:      Right knee: Normal.      Left knee: No effusion or bony tenderness. Decreased range of motion (stiffness). Tenderness (prepatellar) present. No LCL laxity, MCL laxity, ACL laxity or PCL laxity.Normal alignment, normal meniscus and normal patellar mobility.   Neurological:      Mental Status: She is alert.       Administrative Statements           "

## 2024-07-12 NOTE — LETTER
July 12, 2024     Patient: Vanan Chu  YOB: 1970  Date of Visit: 7/12/2024      To Whom it May Concern:    Vanna Chu is under my professional care. Vanna was seen in my office on 7/12/2024. Vanna may return to work on 7/22/24 .    If you have any questions or concerns, please don't hesitate to call.         Sincerely,          GILLIAN Luo        CC: No Recipients

## 2024-08-06 DIAGNOSIS — R73.01 IMPAIRED FASTING GLUCOSE: Primary | ICD-10-CM

## 2024-08-08 DIAGNOSIS — M25.562 ACUTE PAIN OF LEFT KNEE: ICD-10-CM

## 2024-08-08 RX ORDER — MELOXICAM 15 MG/1
15 TABLET ORAL DAILY PRN
Qty: 30 TABLET | Refills: 0 | Status: SHIPPED | OUTPATIENT
Start: 2024-08-08

## 2024-08-18 LAB
ALBUMIN SERPL-MCNC: 4.4 G/DL (ref 3.6–5.1)
ALBUMIN/GLOB SERPL: 1.8 (CALC) (ref 1–2.5)
ALP SERPL-CCNC: 49 U/L (ref 37–153)
ALT SERPL-CCNC: 30 U/L (ref 6–29)
AST SERPL-CCNC: 18 U/L (ref 10–35)
BASOPHILS # BLD AUTO: 86 CELLS/UL (ref 0–200)
BASOPHILS NFR BLD AUTO: 1 %
BILIRUB SERPL-MCNC: 0.6 MG/DL (ref 0.2–1.2)
BUN SERPL-MCNC: 17 MG/DL (ref 7–25)
BUN/CREAT SERPL: ABNORMAL (CALC) (ref 6–22)
CALCIUM SERPL-MCNC: 9.3 MG/DL (ref 8.6–10.4)
CHLORIDE SERPL-SCNC: 105 MMOL/L (ref 98–110)
CHOLEST SERPL-MCNC: 344 MG/DL
CHOLEST/HDLC SERPL: 8 (CALC)
CO2 SERPL-SCNC: 28 MMOL/L (ref 20–32)
CREAT SERPL-MCNC: 0.58 MG/DL (ref 0.5–1.03)
EOSINOPHIL # BLD AUTO: 353 CELLS/UL (ref 15–500)
EOSINOPHIL NFR BLD AUTO: 4.1 %
ERYTHROCYTE [DISTWIDTH] IN BLOOD BY AUTOMATED COUNT: 12.7 % (ref 11–15)
GFR/BSA.PRED SERPLBLD CYS-BASED-ARV: 109 ML/MIN/1.73M2
GLOBULIN SER CALC-MCNC: 2.5 G/DL (CALC) (ref 1.9–3.7)
GLUCOSE SERPL-MCNC: 102 MG/DL (ref 65–99)
HBA1C MFR BLD: 5.7 % OF TOTAL HGB
HCT VFR BLD AUTO: 42.6 % (ref 35–45)
HDLC SERPL-MCNC: 43 MG/DL
HGB BLD-MCNC: 14.8 G/DL (ref 11.7–15.5)
LDLC SERPL CALC-MCNC: 250 MG/DL (CALC)
LYMPHOCYTES # BLD AUTO: 2640 CELLS/UL (ref 850–3900)
LYMPHOCYTES NFR BLD AUTO: 30.7 %
MCH RBC QN AUTO: 31.6 PG (ref 27–33)
MCHC RBC AUTO-ENTMCNC: 34.7 G/DL (ref 32–36)
MCV RBC AUTO: 91 FL (ref 80–100)
MONOCYTES # BLD AUTO: 722 CELLS/UL (ref 200–950)
MONOCYTES NFR BLD AUTO: 8.4 %
NEUTROPHILS # BLD AUTO: 4799 CELLS/UL (ref 1500–7800)
NEUTROPHILS NFR BLD AUTO: 55.8 %
NONHDLC SERPL-MCNC: 301 MG/DL (CALC)
PLATELET # BLD AUTO: 354 THOUSAND/UL (ref 140–400)
PMV BLD REES-ECKER: 10 FL (ref 7.5–12.5)
POTASSIUM SERPL-SCNC: 4.4 MMOL/L (ref 3.5–5.3)
PROT SERPL-MCNC: 6.9 G/DL (ref 6.1–8.1)
RBC # BLD AUTO: 4.68 MILLION/UL (ref 3.8–5.1)
SODIUM SERPL-SCNC: 140 MMOL/L (ref 135–146)
TRIGL SERPL-MCNC: 285 MG/DL
TSH SERPL-ACNC: 0.74 MIU/L
WBC # BLD AUTO: 8.6 THOUSAND/UL (ref 3.8–10.8)

## 2024-11-04 DIAGNOSIS — M25.562 ACUTE PAIN OF LEFT KNEE: ICD-10-CM

## 2024-11-05 RX ORDER — MELOXICAM 15 MG/1
15 TABLET ORAL DAILY PRN
Qty: 30 TABLET | Refills: 0 | Status: SHIPPED | OUTPATIENT
Start: 2024-11-05

## 2024-11-09 DIAGNOSIS — I10 ESSENTIAL HYPERTENSION: ICD-10-CM

## 2024-11-11 RX ORDER — AMLODIPINE AND BENAZEPRIL HYDROCHLORIDE 5; 10 MG/1; MG/1
1 CAPSULE ORAL DAILY
Qty: 90 CAPSULE | Refills: 1 | Status: SHIPPED | OUTPATIENT
Start: 2024-11-11

## 2024-11-25 ENCOUNTER — TELEPHONE (OUTPATIENT)
Age: 54
End: 2024-11-25

## 2024-11-25 NOTE — TELEPHONE ENCOUNTER
Pt has completed step one of smoking cessation by using OTC nicoderm patch. Pt is ready for step 2 for prescription patch that her ins will cover. Pt is requesting rx to be sent to please. Pt stated she has talked with PCP regarding smoking cessation.  [facility] TERRY Minaya - [address] [place] [address] [phone]   Pt callback # [phone].

## 2024-11-26 DIAGNOSIS — F17.210 CIGARETTE NICOTINE DEPENDENCE WITHOUT COMPLICATION: Primary | ICD-10-CM

## 2024-11-26 RX ORDER — NICOTINE 21 MG/24HR
1 PATCH, TRANSDERMAL 24 HOURS TRANSDERMAL EVERY 24 HOURS
Qty: 28 PATCH | Refills: 0 | Status: SHIPPED | OUTPATIENT
Start: 2024-11-26

## 2025-05-14 DIAGNOSIS — I10 ESSENTIAL HYPERTENSION: ICD-10-CM

## 2025-05-16 RX ORDER — AMLODIPINE AND BENAZEPRIL HYDROCHLORIDE 5; 10 MG/1; MG/1
1 CAPSULE ORAL DAILY
Qty: 90 CAPSULE | Refills: 1 | Status: SHIPPED | OUTPATIENT
Start: 2025-05-16